# Patient Record
Sex: FEMALE | Race: WHITE | NOT HISPANIC OR LATINO | Employment: FULL TIME | URBAN - METROPOLITAN AREA
[De-identification: names, ages, dates, MRNs, and addresses within clinical notes are randomized per-mention and may not be internally consistent; named-entity substitution may affect disease eponyms.]

---

## 2017-01-26 RX ORDER — FAMCICLOVIR 500 MG/1
500 TABLET ORAL 2 TIMES DAILY
Qty: 60 TAB | Refills: 1 | Status: SHIPPED | OUTPATIENT
Start: 2017-01-26 | End: 2018-06-15 | Stop reason: SDUPTHER

## 2017-01-26 NOTE — TELEPHONE ENCOUNTER
Please notify patient she is due for her follow-up annual exam, we have not seen her since October 2015

## 2017-02-13 ENCOUNTER — OFFICE VISIT (OUTPATIENT)
Dept: MEDICAL GROUP | Facility: MEDICAL CENTER | Age: 42
End: 2017-02-13
Payer: COMMERCIAL

## 2017-02-13 VITALS
WEIGHT: 134 LBS | HEART RATE: 59 BPM | BODY MASS INDEX: 21.53 KG/M2 | SYSTOLIC BLOOD PRESSURE: 110 MMHG | HEIGHT: 66 IN | TEMPERATURE: 98.7 F | DIASTOLIC BLOOD PRESSURE: 64 MMHG | OXYGEN SATURATION: 98 %

## 2017-02-13 DIAGNOSIS — C44.91 BASAL CELL CARCINOMA: ICD-10-CM

## 2017-02-13 DIAGNOSIS — Z00.00 PREVENTATIVE HEALTH CARE: Chronic | ICD-10-CM

## 2017-02-13 DIAGNOSIS — Z86.59 HISTORY OF DEPRESSION: ICD-10-CM

## 2017-02-13 PROCEDURE — 99396 PREV VISIT EST AGE 40-64: CPT | Performed by: INTERNAL MEDICINE

## 2017-02-13 ASSESSMENT — ENCOUNTER SYMPTOMS
PALPITATIONS: 0
BLURRED VISION: 0
INSOMNIA: 0
HEARTBURN: 0
HEADACHES: 0
ABDOMINAL PAIN: 0
WEIGHT LOSS: 0
DEPRESSION: 0
DOUBLE VISION: 0
COUGH: 0
SHORTNESS OF BREATH: 0
CONSTIPATION: 0
DIARRHEA: 0

## 2017-02-13 ASSESSMENT — PATIENT HEALTH QUESTIONNAIRE - PHQ9: CLINICAL INTERPRETATION OF PHQ2 SCORE: 0

## 2017-02-13 NOTE — MR AVS SNAPSHOT
"        Jing Rojas   2017 4:40 PM   Office Visit   MRN: [id number]    Department:  [facility]   Dept Phone:  [phone]    Description:  Female : 1975   Provider:  Azael Nunes M.D.           Allergies as of 2017     No Known Allergies      You were diagnosed with     Basal cell carcinoma   [[id number]]       Preventative health care   [[id number]]         Vital Signs     Blood Pressure Pulse Temperature Height Weight Body Mass Index    110/64 mmHg 59 37.1 °C (98.7 °F) 1.676 m (5' 6\") 60.782 kg (134 lb) 21.64 kg/m2    Oxygen Saturation Smoking Status                98% Never Smoker           Basic Information     Date Of Birth Sex Race Ethnicity Preferred Language    1975 Female White Non- English      Problem List              ICD-10-CM Priority Class Noted - Resolved    History of depression Z86.59   2009 - Present    Preventative health care (Chronic) Z00.00   2009 - Present    Insomnia (Chronic) G47.00   2009 - Present    Dyslipidemia (Chronic) E78.5   2011 - Present    Basal cell carcinoma C44.91   2017 - Present      Health Maintenance        Date Due Completion Dates    PAP SMEAR 1996 ---    MAMMOGRAM 2015 ---    IMM DTaP/Tdap/Td Vaccine (2 - Td) 2023            Current Immunizations     Influenza Vaccine Quad Inj (Pf) 2016 10:41 AM, 10/27/2015  9:50 AM    Tdap Vaccine 2013      Below and/or attached are the medications your provider expects you to take. Review all of your home medications and newly ordered medications with your provider and/or pharmacist. Follow medication instructions as directed by your provider and/or pharmacist. Please keep your medication list with you and share with your provider. Update the information when medications are discontinued, doses are changed, or new medications (including over-the-counter products) are added; and carry medication information at all times in the event of " emergency situations     Allergies:  No Known Allergies          Medications  Valid as of: February 13, 2017 -  5:26 PM    Generic Name Brand Name Tablet Size Instructions for use    Drospirenone-Ethinyl Estradiol (Tab) MARION 3-0.02 MG Take 1 Tab by mouth every day.        Famciclovir (Tab) FAMVIR 500 MG Take 1 Tab by mouth 2 Times a Day.        Sertraline HCl (Tab) ZOLOFT 50 MG Take 1 Tab by mouth every day.        Temazepam (Cap) RESTORIL 15 MG Take 1 Cap by mouth at bedtime as needed for Sleep.        .                 Medicines prescribed today were sent to:     [facility] #[id number] - [place], [place] - [address]    [address] [place] [address]    Phone: [phone] Fax: [phone]    Open 24 Hours?: No      Medication refill instructions:       If your prescription bottle indicates you have medication refills left, it is not necessary to call your provider’s office. Please contact your pharmacy and they will refill your medication.    If your prescription bottle indicates you do not have any refills left, you may request refills at any time through one of the following ways: The online [employer] system (except Urgent Care), by calling your provider’s office, or by asking your pharmacy to contact your provider’s office with a refill request. Medication refills are processed only during regular business hours and may not be available until the next business day. Your provider may request additional information or to have a follow-up visit with you prior to refilling your medication.   *Please Note: Medication refills are assigned a new Rx number when refilled electronically. Your pharmacy may indicate that no refills were authorized even though a new prescription for the same medication is available at the pharmacy. Please request the medicine by name with the pharmacy before contacting your provider for a refill.        Your To Do List     Future Labs/Procedures Complete By Expires    CBC  WITH DIFFERENTIAL  As directed 2/14/2018    COMP METABOLIC PANEL  As directed 2/14/2018    LIPID PROFILE  As directed 2/14/2018    TSH  As directed 2/14/2018    VITAMIN D,25 HYDROXY  As directed 2/14/2018      Other Notes About Your Plan                      Bitstrips Access Code: [email or web address]  Expires: 3/15/2017  5:26 PM    Bitstrips  A secure, online tool to manage your health information     [employer]’s Bitstrips® is a secure, online tool that connects you to your personalized health information from the privacy of your home -- day or night - making it very easy for you to manage your healthcare. Once the activation process is completed, you can even access your medical information using the Bitstrips katiana, which is available for free in the [employer] Katiana store or Google Play store.     Bitstrips provides the following levels of access (as shown below):   My Chart Features   [facility]  Specialists [facility]  Urgent  Care Non-[facility]  Primary Care  Doctor   Email your healthcare team securely and privately 24/7 X X X    Manage appointments: schedule your next appointment; view details of past/upcoming appointments X      Request prescription refills. X      View recent personal medical records, including lab and immunizations X X X X   View health record, including health history, allergies, medications X X X X   Read reports about your outpatient visits, procedures, consult and ER notes X X X X   See your discharge summary, which is a recap of your hospital and/or ER visit that includes your diagnosis, lab results, and care plan. X X       How to register for Bitstrips:  1. Go to  [email or web address].  2. Click on the Sign Up Now box, which takes you to the New Member Sign Up page. You will need to provide the following information:  a. Enter your Bitstrips Access Code exactly as it appears at the top of this page. (You will not need to use this code after you’ve completed the sign-up process. If  you do not sign up before the expiration date, you must request a new code.)   b. Enter your date of birth.   c. Enter your home email address.   d. Click Submit, and follow the next screen’s instructions.  3. Create a [employer] ID. This will be your [employer] login ID and cannot be changed, so think of one that is secure and easy to remember.  4. Create a [employer] password. You can change your password at any time.  5. Enter your Password Reset Question and Answer. This can be used at a later time if you forget your password.   6. Enter your e-mail address. This allows you to receive e-mail notifications when new information is available in [employer].  7. Click Sign Up. You can now view your health information.    For assistance activating your [employer] account, call [phone]

## 2017-02-14 NOTE — PROGRESS NOTES
Subjective:      Jing Rojas is a 41 y.o. female who presents with annual exam       HPI     Here for annual exam  Sees gyn   Work out 4-5 days per week and does cardio and weights daily     SH no tobacco, etoh occasionally,diet gluten free, works as teacher   FH father heart disease  On zoloft 100 mg daily per gynecology for depression    Eye exam none recently   Teeth cleaning regularly  No regular nsaids    Skin lesion removed  basal cell forehead  Medications, allergies, medical history, surgical history, social history, family history reviewed and updated        Current Outpatient Prescriptions   Medication Sig Dispense Refill   • famciclovir (FAMVIR) 500 MG Tab Take 1 Tab by mouth 2 Times a Day. 60 Tab 1   • temazepam (RESTORIL) 15 MG Cap Take 1 Cap by mouth at bedtime as needed for Sleep. 30 Cap 3   • drospirenone-ethinyl estradiol (JOSE) 3-0.02 MG per tablet Take 1 Tab by mouth every day. 28 Tab 3   • sertraline (ZOLOFT) 50 MG TABS Take 1 Tab by mouth every day. 30 Tab 11     No current facility-administered medications for this visit.     Patient Active Problem List    Diagnosis Date Noted   • Dyslipidemia 08/17/2011   • Insomnia 11/25/2009   • History of depression 06/28/2009   • Preventative health care 06/28/2009       Preventative  2/14/13 tdap  2/14/13 vit d 35  2014 gyn   10/5/15 on jose per gyn     Insomnia  1/25/13 on ambien, change to restoril 30 mg  2/14/13 taper off restoril since trying to become pregnant  2/19/14 ambien per  gyn  8/1/14 on restoril 15 mg    History depression  11/08 zoloft since    6/11 on zoloft 100 mg   2/14/13 tapering off zoloft down to 50 mg; then off if possible since trying to become pregnant  8/1/14 on zoloft 50 mg  10/5/15 on zoloft 100 mg per gynecology, tried to taper off this summer but not successful    Dyslipidemia  8/11 chol 199,trig 162,hdl 60,ldl 107      Depression Screening    Little interest or pleasure in doing things?   "0 - not at all  Feeling down, depressed , or hopeless? 0 - not at all  Patient Health Questionnaire Score: 0     If depressive symptoms identified deferred to follow up visit unless specifically addressed in assesment and plan.        Review of Systems   Constitutional: Negative for weight loss and malaise/fatigue.   Eyes: Negative for blurred vision and double vision.   Respiratory: Negative for cough and shortness of breath.    Cardiovascular: Negative for chest pain and palpitations.   Gastrointestinal: Negative for heartburn, abdominal pain, diarrhea and constipation.   Genitourinary: Negative for frequency.   Musculoskeletal: Negative for joint pain.   Neurological: Negative for headaches.   Endo/Heme/Allergies: Negative for environmental allergies.   Psychiatric/Behavioral: Negative for depression. The patient does not have insomnia.           Objective:     /64 mmHg  Pulse 59  Temp(Src) 37.1 °C (98.7 °F)  Ht 1.676 m (5' 6\")  Wt 60.782 kg (134 lb)  BMI 21.64 kg/m2  SpO2 98%     Physical Exam   Constitutional: She appears well-developed and well-nourished. No distress.   HENT:   Head: Normocephalic and atraumatic.   Right Ear: External ear normal.   Left Ear: External ear normal.   Mouth/Throat: Oropharynx is clear and moist.   Eyes: Conjunctivae are normal. Right eye exhibits no discharge. Left eye exhibits no discharge. No scleral icterus.   Neck: Normal range of motion. Neck supple. No JVD present. No thyromegaly present.   Cardiovascular: Normal rate and regular rhythm.    No murmur heard.  Pulmonary/Chest: Effort normal and breath sounds normal. No respiratory distress. She has no wheezes.   Abdominal: She exhibits no distension.   Musculoskeletal: She exhibits no edema.   Neurological: She is alert.   Skin: Skin is warm. She is not diaphoretic.   Psychiatric: She has a normal mood and affect. Her behavior is normal.   Nursing note and vitals reviewed.              Assessment/Plan: "     Assessment  #1 annual exam    #2 depression stable on Zoloft 100 mg per gynecology, has seen therapists previously, no worsening mood changes, good social support with family, , stress at work is stable    #3 basal cell skin cancer followed by dermatology, uses sunscreen    #4 insomnia stable on Restoril intermittently, does not use nightly    #5 HSV on Famvir suppressive therapy    #6 birth control on jose per gynecology      Plan  #!  Labs    #2 mammogram per gynecology    #3 follow-up dermatology, use sunscreen    #4 sleep hygiene discussed    #5 lifestyle modification, diet, exercise discussed    #6 okay to taper Zoloft from my perspective, if she does initiate taper Zoloft 100 mg alternating with 50 mg for at least 2 weeks and monitor mood, if stable can decrease by 50 mg every 2-3 weeks    #7 declined psychotherapy follow-up at this time good social support, exercising, keep alcohol to minimum, continue no caffeine, good sleep hygiene discussed    #8 follow-up one year

## 2017-03-20 PROBLEM — L57.9 SKIN CHANGES DUE TO CHRONIC EXPOSURE TO NONIONIZING RADIATION, UNSPECIFIED: Status: ACTIVE | Noted: 2017-03-20

## 2017-09-13 ENCOUNTER — HOSPITAL ENCOUNTER (OUTPATIENT)
Dept: RADIOLOGY | Facility: MEDICAL CENTER | Age: 42
End: 2017-09-13
Attending: OBSTETRICS & GYNECOLOGY
Payer: COMMERCIAL

## 2017-09-13 DIAGNOSIS — Z12.31 VISIT FOR SCREENING MAMMOGRAM: ICD-10-CM

## 2017-09-13 PROCEDURE — G0202 SCR MAMMO BI INCL CAD: HCPCS

## 2017-09-25 ENCOUNTER — HOSPITAL ENCOUNTER (OUTPATIENT)
Dept: LAB | Facility: MEDICAL CENTER | Age: 42
End: 2017-09-25
Attending: INTERNAL MEDICINE
Payer: COMMERCIAL

## 2017-09-25 DIAGNOSIS — Z00.00 PREVENTATIVE HEALTH CARE: Chronic | ICD-10-CM

## 2017-09-25 PROBLEM — R94.4 DECREASED GFR: Status: ACTIVE | Noted: 2017-09-25

## 2017-09-25 LAB
25(OH)D3 SERPL-MCNC: 36 NG/ML (ref 30–100)
ALBUMIN SERPL BCP-MCNC: 3.9 G/DL (ref 3.2–4.9)
ALBUMIN/GLOB SERPL: 1.3 G/DL
ALP SERPL-CCNC: 27 U/L (ref 30–99)
ALT SERPL-CCNC: 13 U/L (ref 2–50)
ANION GAP SERPL CALC-SCNC: 8 MMOL/L (ref 0–11.9)
AST SERPL-CCNC: 22 U/L (ref 12–45)
BASOPHILS # BLD AUTO: 0.9 % (ref 0–1.8)
BASOPHILS # BLD: 0.06 K/UL (ref 0–0.12)
BILIRUB SERPL-MCNC: 0.5 MG/DL (ref 0.1–1.5)
BUN SERPL-MCNC: 17 MG/DL (ref 8–22)
CALCIUM SERPL-MCNC: 9.4 MG/DL (ref 8.5–10.5)
CHLORIDE SERPL-SCNC: 103 MMOL/L (ref 96–112)
CHOLEST SERPL-MCNC: 197 MG/DL (ref 100–199)
CO2 SERPL-SCNC: 25 MMOL/L (ref 20–33)
CREAT SERPL-MCNC: 1.1 MG/DL (ref 0.5–1.4)
EOSINOPHIL # BLD AUTO: 0.43 K/UL (ref 0–0.51)
EOSINOPHIL NFR BLD: 6.6 % (ref 0–6.9)
ERYTHROCYTE [DISTWIDTH] IN BLOOD BY AUTOMATED COUNT: 39.9 FL (ref 35.9–50)
GFR SERPL CREATININE-BSD FRML MDRD: 55 ML/MIN/1.73 M 2
GLOBULIN SER CALC-MCNC: 3.1 G/DL (ref 1.9–3.5)
GLUCOSE SERPL-MCNC: 77 MG/DL (ref 65–99)
HCT VFR BLD AUTO: 40.3 % (ref 37–47)
HDLC SERPL-MCNC: 91 MG/DL
HGB BLD-MCNC: 13.2 G/DL (ref 12–16)
IMM GRANULOCYTES # BLD AUTO: 0.01 K/UL (ref 0–0.11)
IMM GRANULOCYTES NFR BLD AUTO: 0.2 % (ref 0–0.9)
LDLC SERPL CALC-MCNC: 79 MG/DL
LYMPHOCYTES # BLD AUTO: 2.58 K/UL (ref 1–4.8)
LYMPHOCYTES NFR BLD: 39.7 % (ref 22–41)
MCH RBC QN AUTO: 30.3 PG (ref 27–33)
MCHC RBC AUTO-ENTMCNC: 32.8 G/DL (ref 33.6–35)
MCV RBC AUTO: 92.6 FL (ref 81.4–97.8)
MONOCYTES # BLD AUTO: 0.47 K/UL (ref 0–0.85)
MONOCYTES NFR BLD AUTO: 7.2 % (ref 0–13.4)
NEUTROPHILS # BLD AUTO: 2.95 K/UL (ref 2–7.15)
NEUTROPHILS NFR BLD: 45.4 % (ref 44–72)
NRBC # BLD AUTO: 0 K/UL
NRBC BLD AUTO-RTO: 0 /100 WBC
PLATELET # BLD AUTO: 230 K/UL (ref 164–446)
PMV BLD AUTO: 10.8 FL (ref 9–12.9)
POTASSIUM SERPL-SCNC: 4.4 MMOL/L (ref 3.6–5.5)
PROT SERPL-MCNC: 7 G/DL (ref 6–8.2)
RBC # BLD AUTO: 4.35 M/UL (ref 4.2–5.4)
SODIUM SERPL-SCNC: 136 MMOL/L (ref 135–145)
TRIGL SERPL-MCNC: 135 MG/DL (ref 0–149)
TSH SERPL DL<=0.005 MIU/L-ACNC: 2.96 UIU/ML (ref 0.3–3.7)
WBC # BLD AUTO: 6.5 K/UL (ref 4.8–10.8)

## 2017-09-25 PROCEDURE — 84443 ASSAY THYROID STIM HORMONE: CPT

## 2017-09-25 PROCEDURE — 85025 COMPLETE CBC W/AUTO DIFF WBC: CPT

## 2017-09-25 PROCEDURE — 82306 VITAMIN D 25 HYDROXY: CPT

## 2017-09-25 PROCEDURE — 80053 COMPREHEN METABOLIC PANEL: CPT

## 2017-09-25 PROCEDURE — 36415 COLL VENOUS BLD VENIPUNCTURE: CPT

## 2017-09-25 PROCEDURE — 80061 LIPID PANEL: CPT

## 2017-09-29 ENCOUNTER — OFFICE VISIT (OUTPATIENT)
Dept: MEDICAL GROUP | Facility: MEDICAL CENTER | Age: 42
End: 2017-09-29
Payer: COMMERCIAL

## 2017-09-29 VITALS
HEIGHT: 66 IN | BODY MASS INDEX: 22.02 KG/M2 | TEMPERATURE: 99.7 F | WEIGHT: 137 LBS | SYSTOLIC BLOOD PRESSURE: 114 MMHG | OXYGEN SATURATION: 99 % | HEART RATE: 59 BPM | DIASTOLIC BLOOD PRESSURE: 62 MMHG

## 2017-09-29 DIAGNOSIS — Z86.59 HISTORY OF DEPRESSION: ICD-10-CM

## 2017-09-29 DIAGNOSIS — J02.9 SORE THROAT: ICD-10-CM

## 2017-09-29 LAB
INT CON NEG: NEGATIVE
INT CON POS: POSITIVE
S PYO AG THROAT QL: NORMAL

## 2017-09-29 PROCEDURE — 99213 OFFICE O/P EST LOW 20 MIN: CPT | Performed by: INTERNAL MEDICINE

## 2017-09-29 PROCEDURE — 87880 STREP A ASSAY W/OPTIC: CPT | Performed by: INTERNAL MEDICINE

## 2017-09-29 RX ORDER — TEMAZEPAM 15 MG/1
15 CAPSULE ORAL NIGHTLY PRN
Qty: 30 CAP | Refills: 5 | Status: SHIPPED
Start: 2017-09-29 | End: 2018-04-11 | Stop reason: SDUPTHER

## 2017-09-29 RX ORDER — DROSPIRENONE AND ETHINYL ESTRADIOL 0.02-3(28)
1 KIT ORAL DAILY
Qty: 28 TAB | Refills: 0
Start: 2017-09-29 | End: 2018-10-16

## 2017-09-29 RX ORDER — ALPRAZOLAM 0.25 MG/1
0.25 TABLET ORAL
Qty: 15 TAB | Refills: 0 | Status: SHIPPED | OUTPATIENT
Start: 2017-09-29 | End: 2018-06-15 | Stop reason: SDUPTHER

## 2017-09-29 NOTE — PROGRESS NOTES
Subjective:      Jing Rojas is a 41 y.o. female who presents with sore throat Follow-Up            HPI  New complaint  Sore throat since monday, tired and fatigued, body aches started sunday improving took motrin 200 mg 3 once per day, no nausea or emesis, joint aches as well, headaches frontal, no vision changes, no neck stiffness, no sinus pressure, no post nasal drip, no cough, no sob.  Symptoms are improving  No recent travel, no recent antibiotics    Has been more irritable recently for 4-5 months, has been exercising regularly, sleep is reasonable, on Zoloft 100 mg, was on 50 mg and increased the dose last week.  Denies any increased stress at home or work.  No depression.  Good social support with family.  Followed by gynecologist    Tries to manage stress by exercising regular, does massage therapy, tries to do meditation  Does have follow-up appointment with therapist in 2 weeks  Minimal caffeine and alcohol  No recent travel or antibiotics   No recent travel   Sees gyn  saw recently  Menses regular taking marion           Current Outpatient Prescriptions   Medication Sig Dispense Refill   • famciclovir (FAMVIR) 500 MG Tab Take 1 Tab by mouth 2 Times a Day. 60 Tab 1   • temazepam (RESTORIL) 15 MG Cap Take 1 Cap by mouth at bedtime as needed for Sleep. 30 Cap 3   • drospirenone-ethinyl estradiol (MARION) 3-0.02 MG per tablet Take 1 Tab by mouth every day. 28 Tab 3   • sertraline (ZOLOFT) 50 MG TABS Take 1 Tab by mouth every day. 30 Tab 11     No current facility-administered medications for this visit.      Patient Active Problem List    Diagnosis Date Noted   • Decreased GFR 09/25/2017   • Basal cell carcinoma 02/13/2017   • Dyslipidemia 08/17/2011   • Insomnia 11/25/2009   • History of depression 06/28/2009   • Preventative health care 06/28/2009       Basal cell carcinoma  2/13/17 basal cell forehead    Decreased GFR  8/17/11 bun 15,creat 0.5,GFR 59  10/27/15 bun 15,creat 1.1,GFR>60  11/12/15 bun  17,creat 0.9,GFR 54  11/5/16 bun 19,creat 1.0,GFR>60  9/25/17 bun 17,creat 1.1,GFR 55    Dyslipidemia  8/11 chol 199,trig 162,hdl 60,ldl 107  9/25/17 chol 197,trig 135,hdl 91,ldl 79     History depression  11/08 zoloft since    6/11 on zoloft 100 mg   2/14/13 tapering off zoloft down to 50 mg; then off if possible since trying to become pregnant  8/1/14 on zoloft 50 mg  10/5/15 on zoloft 100 mg per gynecology, tried to taper off this summer but not successful  2/13/17 on zoloft 100 mg per gynecology, will try to taper    Insomnia  1/25/13 on ambien, change to restoril 30 mg  2/14/13 taper off restoril since trying to become pregnant  2/19/14 ambien per  gyn  8/1/14 on restoril 15 mg      Preventative  2/14/13 tdap  2014 gyn   10/5/15 on jose per gyn   9/25/17 vit d 36       ROS       Objective:          Physical Exam   Constitutional: She appears well-developed and well-nourished. No distress.   HENT:   Head: Normocephalic and atraumatic.   Right Ear: External ear normal.   Left Ear: External ear normal.   Nose: Nose normal.   Mouth/Throat: Oropharynx is clear and moist.   Eyes: Conjunctivae are normal. Right eye exhibits no discharge. Left eye exhibits no discharge. No scleral icterus.   Neck: Neck supple. No JVD present. No thyromegaly present.   Cardiovascular: Normal rate, regular rhythm and normal heart sounds.    Pulmonary/Chest: Effort normal and breath sounds normal. No respiratory distress. She has no wheezes.   Abdominal: She exhibits no distension.   Musculoskeletal: She exhibits no edema.   Neurological: She has normal reflexes.   Skin: Skin is warm. No rash noted. She is not diaphoretic. No erythema.   Psychiatric: She has a normal mood and affect. Her behavior is normal.   Nursing note and vitals reviewed.         Normal affect, insight, judgment     Assessment/Plan:        Assessment  #! Pharyngitis.  No adenopathy, symptoms improving, rapid strep test negative likely  resolving viral infection    #2 irritability on Zoloft 100 mg having recently increased dose from 50 mg 1-1/2 weeks ago, no depression    #3 breath control pill jose per gynecology      Plan  #!  Rapid strep Strep Negative, likely resolving viral illness, recommended rest, hydration    #2 flu vaccine at pharmacy in one week    #3 since she just increased zoloft to 100 mg 1-1/2 weeks ago continue on the current dosing regimen, may take 2 more weeks for benefit,, monitor for persistent irritability, continue exercise, diet, meditation, yoga, massage therapy     #4 Notify us in one and half weeks whether or not experiencing improvement with Zoloft if not consider increasing dose to 150 or trying different medication    #5 continue psychotherapy    #6 notify us for worsening symptoms    #7 follow up 3 months

## 2017-09-30 NOTE — TELEPHONE ENCOUNTER
Restoril      Was the patient seen in the last year in this department? Yes Last 8/29/17    Does patient have an active prescription for medications requested? Yes     Received Request Via: Pharmacy

## 2018-04-11 DIAGNOSIS — G47.00 INSOMNIA, UNSPECIFIED TYPE: Chronic | ICD-10-CM

## 2018-04-11 RX ORDER — TEMAZEPAM 15 MG/1
15 CAPSULE ORAL NIGHTLY PRN
Qty: 30 CAP | Refills: 3 | Status: SHIPPED
Start: 2018-04-11 | End: 2018-05-11

## 2018-08-30 DIAGNOSIS — G47.00 INSOMNIA, UNSPECIFIED TYPE: ICD-10-CM

## 2018-08-30 RX ORDER — TEMAZEPAM 15 MG/1
15 CAPSULE ORAL NIGHTLY PRN
Qty: 30 CAP | Refills: 2 | Status: SHIPPED
Start: 2018-08-30 | End: 2018-09-29

## 2018-10-11 ENCOUNTER — OFFICE VISIT (OUTPATIENT)
Dept: URGENT CARE | Facility: CLINIC | Age: 43
End: 2018-10-11
Payer: COMMERCIAL

## 2018-10-11 VITALS
SYSTOLIC BLOOD PRESSURE: 92 MMHG | DIASTOLIC BLOOD PRESSURE: 60 MMHG | OXYGEN SATURATION: 97 % | HEIGHT: 65 IN | RESPIRATION RATE: 16 BRPM | HEART RATE: 66 BPM | WEIGHT: 166 LBS | TEMPERATURE: 99.7 F | BODY MASS INDEX: 27.66 KG/M2

## 2018-10-11 DIAGNOSIS — R53.83 FATIGUE, UNSPECIFIED TYPE: ICD-10-CM

## 2018-10-11 DIAGNOSIS — M79.10 MYALGIA: ICD-10-CM

## 2018-10-11 DIAGNOSIS — H10.403 CHRONIC CONJUNCTIVITIS OF BOTH EYES, UNSPECIFIED CHRONIC CONJUNCTIVITIS TYPE: ICD-10-CM

## 2018-10-11 PROCEDURE — 99204 OFFICE O/P NEW MOD 45 MIN: CPT | Performed by: EMERGENCY MEDICINE

## 2018-10-11 RX ORDER — ALPRAZOLAM 0.5 MG/1
0.5 TABLET ORAL NIGHTLY PRN
COMMUNITY
End: 2018-10-16 | Stop reason: SDUPTHER

## 2018-10-11 ASSESSMENT — ENCOUNTER SYMPTOMS
EYE REDNESS: 1
COUGH: 1
CHANGE IN BOWEL HABIT: 0
NAUSEA: 0
FATIGUE: 1
ABDOMINAL PAIN: 0
BRUISES/BLEEDS EASILY: 0
FEVER: 0
MYALGIAS: 1
ANOREXIA: 0
VOMITING: 0
DEPRESSION: 0
SORE THROAT: 1
SHORTNESS OF BREATH: 0
INSOMNIA: 1
BLOOD IN STOOL: 0
HEADACHES: 0
SWOLLEN GLANDS: 0
NUMBNESS: 0
ARTHRALGIAS: 1
JOINT SWELLING: 0
EYE DISCHARGE: 0

## 2018-10-11 ASSESSMENT — PATIENT HEALTH QUESTIONNAIRE - PHQ9: CLINICAL INTERPRETATION OF PHQ2 SCORE: 0

## 2018-10-11 NOTE — PATIENT INSTRUCTIONS
Ketotifen eye solution  What is this medicine?  KETOTIFEN (son toe NORMA fen) eye drops are used in the eye to prevent itching that is caused by allergies.  This medicine may be used for other purposes; ask your health care provider or pharmacist if you have questions.  COMMON BRAND NAME(S): Alaway, Children's Alaway, Claritin Eye, Eye Itch Relief, Itchy Eye, Zaditor, Zyrtec Itchy Eye  What should I tell my health care provider before I take this medicine?  They need to know if you have any of these conditions:  -wear contact lenses  -an unusual or allergic reaction to ketotifen, other medicines, foods, dyes, or preservatives  -pregnant or trying to get pregnant  -breast-feeding  How should I use this medicine?  This medicine is only for use in the eye. Do not take by mouth. Follow the directions on the prescription label. Wash hands before and after use. Tilt the head back slightly and pull down the lower eyelid with the index finger to form a pouch. Try not to touch the tip of the dropper to your eye, fingertips, or any other surface. Squeeze the prescribed number of drops into the pouch. Close the eye gently to spread the drops. Your vision may blur for a few minutes. Use your doses at regular intervals. Do not use your medicine more often than directed. If you use other eye medicines, they should be used at least 10 minutes before or after this medicine. Eye ointments should be applied last.  Talk to your pediatrician regarding the use of this medicine in children. Special care may be needed. While this drug may be prescribed for children as young as 3 years of age for selected conditions, precautions do apply.  Overdosage: If you think you have taken too much of this medicine contact a poison control center or emergency room at once.  NOTE: This medicine is only for you. Do not share this medicine with others.  What if I miss a dose?  If you miss a dose, use it as soon as you can. If it is almost time for your  next dose, use only that dose. Do not use double or extra doses.  What may interact with this medicine?  Interactions are not expected. Do not use any other eye products without telling your doctor or health care professional.  This list may not describe all possible interactions. Give your health care provider a list of all the medicines, herbs, non-prescription drugs, or dietary supplements you use. Also tell them if you smoke, drink alcohol, or use illegal drugs. Some items may interact with your medicine.  What should I watch for while using this medicine?  Tell your doctor or health care professional if your symptoms do not start to improve in 2 or 3 days.  Report any serious side effects promptly. Stop using this medicine if your eyes get swollen, painful, or have a discharge, and see your doctor or health care professional as soon as you can.  Contact lenses may be inserted 10 minutes after putting the medicine in the eye. Do not wear contact lenses if your eyes are red. You should not use this medicine to treat irritation that is caused by contact lenses.  What side effects may I notice from receiving this medicine?  Side effects that you should report to your doctor or health care professional as soon as possible:  -skin rash, hives, or itching  -swelling of the lips, tongue or face  Side effects that usually do not require medical attention (report to your doctor or health care professional if they continue or are bothersome):  -burning, discomfort, stinging, or tearing immediately after use  -eyelid swelling or eye dryness  -headache  -sensitivity of the eyes to sunlight  This list may not describe all possible side effects. Call your doctor for medical advice about side effects. You may report side effects to FDA at 1-067-FDA-9759.  Where should I keep my medicine?  Keep out of the reach of children.  Store between 4 and 25 degrees C (39 and 77 degrees F). Do not freeze. Protect from light. Throw away  any unused medicine after the expiration date.  NOTE: This sheet is a summary. It may not cover all possible information. If you have questions about this medicine, talk to your doctor, pharmacist, or health care provider.  © 2018 [employer]vier/Gold Standard (2009-06-19 14:36:34)  Muscle Pain, Adult  Muscle pain (myalgia) may be mild or severe. In most cases, the pain lasts only a short time and it goes away without treatment. It is normal to feel some muscle pain after starting a workout program. Muscles that have not been used often will be sore at first.  Muscle pain may also be caused by many other things, including:  · Overuse or muscle strain, especially if you are not in shape. This is the most common cause of muscle pain.  · Injury.  · Bruises.  · Viruses, such as the flu.  · Infectious diseases.  · A chronic condition that causes muscle tenderness, fatigue, and headache (fibromyalgia).  · A condition, such as lupus, in which the body’s disease-fighting system attacks other organs in the body (autoimmune or rheumatologic diseases).  · Certain drugs, including ACE inhibitors and statins.  To diagnose the cause of your muscle pain, your health care provider will do a physical exam and ask questions about the pain and when it began. If you have not had muscle pain for very long, your health care provider may want to wait before doing much testing. If your muscle pain has lasted a long time, your health care provider may want to run tests right away. In some cases, this may include tests to rule out certain conditions or illnesses.  Treatment for muscle pain depends on the cause. Home care is often enough to relieve muscle pain. Your health care provider may also prescribe anti-inflammatory medicine.  Follow these instructions at home:  Activity  · If overuse is causing your muscle pain:  ¨ Slow down your activities until the pain goes away.  ¨ Do regular, gentle exercises if you are not usually active.  ¨ Warm up  before exercising. Stretch before and after exercising. This can help lower the risk of muscle pain.  · Do not continue working out if the pain is very bad. Bad pain could mean that you have injured a muscle.  Managing pain and discomfort  · If directed, apply ice to the sore muscle:  ¨ Put ice in a plastic bag.  ¨ Place a towel between your skin and the bag.  ¨ Leave the ice on for 20 minutes, 2-3 times a day.  · You may also alternate between applying ice and applying heat as told by your health care provider. To apply heat, use the heat source that your health care provider recommends, such as a moist heat pack or a heating pad.  ¨ Place a towel between your skin and the heat source.  ¨ Leave the heat on for 20-30 minutes.  ¨ Remove the heat if your skin turns bright red. This is especially important if you are unable to feel pain, heat, or cold. You may have a greater risk of getting burned.  Medicines  · Take over-the-counter and prescription medicines only as told by your health care provider.  · Do not drive or use heavy machinery while taking prescription pain medicine.  Contact a health care provider if:  · Your muscle pain gets worse and medicines do not help.  · You have muscle pain that lasts longer than 3 days.  · You have a rash or fever along with muscle pain.  · You have muscle pain after a tick bite.  · You have muscle pain while working out, even though you are in good physical condition.  · You have redness, soreness, or swelling along with muscle pain.  · You have muscle pain after starting a new medicine or changing the dose of a medicine.  Get help right away if:  · You have trouble breathing.  · You have trouble swallowing.  · You have muscle pain along with a stiff neck, fever, and vomiting.  · You have severe muscle weakness or cannot move part of your body.  This information is not intended to replace advice given to you by your health care provider. Make sure you discuss any questions you  have with your health care provider.  Document Released: 11/09/2007 Document Revised: 07/07/2017 Document Reviewed: 05/09/2017  [employer] Interactive Patient Education © 2017 [employer]  Fatigue  Introduction  Fatigue is feeling tired all of the time, a lack of energy, or a lack of motivation. Occasional or mild fatigue is often a normal response to activity or life in general. However, long-lasting (chronic) or extreme fatigue may indicate an underlying medical condition.  Follow these instructions at home:  Watch your fatigue for any changes. The following actions may help to lessen any discomfort you are feeling:  · Talk to your health care provider about how much sleep you need each night. Try to get the required amount every night.  · Take medicines only as directed by your health care provider.  · Eat a healthy and nutritious diet. Ask your health care provider if you need help changing your diet.  · Drink enough fluid to keep your urine clear or pale yellow.  · Practice ways of relaxing, such as yoga, meditation, massage therapy, or acupuncture.  · Exercise regularly.  · Change situations that cause you stress. Try to keep your work and personal routine reasonable.  · Do not abuse illegal drugs.  · Limit alcohol intake to no more than 1 drink per day for nonpregnant women and 2 drinks per day for men. One drink equals 12 ounces of beer, 5 ounces of wine, or 1½ ounces of hard liquor.  · Take a multivitamin, if directed by your health care provider.  Contact a health care provider if:  · Your fatigue does not get better.  · You have a fever.  · You have unintentional weight loss or gain.  · You have headaches.  · You have difficulty:  ¨ Falling asleep.  ¨ Sleeping throughout the night.  · You feel angry, guilty, anxious, or sad.  · You are unable to have a bowel movement (constipation).  · You skin is dry.  · Your legs or another part of your body is swollen.  Get help right away if:  · You feel  confused.  · Your vision is blurry.  · You feel faint or pass out.  · You have a severe headache.  · You have severe abdominal, pelvic, or back pain.  · You have chest pain, shortness of breath, or an irregular or fast heartbeat.  · You are unable to urinate or you urinate less than normal.  · You develop abnormal bleeding, such as bleeding from the rectum, vagina, nose, lungs, or nipples.  · You vomit blood.  · You have thoughts about harming yourself or committing suicide.  · You are worried that you might harm someone else.  This information is not intended to replace advice given to you by your health care provider. Make sure you discuss any questions you have with your health care provider.  Document Released: 10/14/2008 Document Revised: 05/25/2017 Document Reviewed: 04/21/2015  © 2017 Elsevier

## 2018-10-11 NOTE — PROGRESS NOTES
Subjective:      Jing Rojas is a 42 y.o. female who presents with Cold Symptoms (x9 wks, pt. states she is a  and she feels like her immune system is down, and she knows something is going on. sore throat, body aches, fatigue, lethargic, tremors. Pt. states she feels like she gets rid of it, but the next day is something new. Also unknow bruises on left leg)            Fatigue   This is a new problem. Episode onset: over 2 months. The problem occurs daily. The problem has been waxing and waning. Associated symptoms include arthralgias, coughing, fatigue, myalgias and a sore throat. Pertinent negatives include no abdominal pain, anorexia, change in bowel habit, chest pain, congestion, fever, headaches, joint swelling, nausea, numbness, rash, swollen glands, urinary symptoms or vomiting. Nothing aggravates the symptoms. She has tried rest for the symptoms. The treatment provided mild relief.   No trauma; noted bruises on legs yesterday.    Review of Systems   Constitutional: Positive for fatigue and malaise/fatigue. Negative for fever.   HENT: Positive for sore throat. Negative for congestion, ear pain, hearing loss and nosebleeds.    Eyes: Positive for redness. Negative for discharge.   Respiratory: Positive for cough. Negative for shortness of breath.         Occasional   Cardiovascular: Negative for chest pain.   Gastrointestinal: Negative for abdominal pain, anorexia, blood in stool, change in bowel habit, nausea and vomiting.   Genitourinary: Negative for dysuria, frequency, hematuria and urgency.   Musculoskeletal: Positive for arthralgias, joint pain and myalgias. Negative for joint swelling.   Skin: Negative for rash.   Neurological: Negative for numbness and headaches.   Endo/Heme/Allergies: Negative for environmental allergies. Does not bruise/bleed easily.   Psychiatric/Behavioral: Negative for depression. The patient has insomnia.      PMH:  has a past medical history of Acne (6/28/2009);  "Contraception (6/28/2009); Depression (6/28/2009); HSV-1 (herpes simplex virus 1) infection (6/28/2009); and Preventative health care (6/28/2009).  MEDS:   Current Outpatient Prescriptions:   •  ALPRAZolam (XANAX) 0.5 MG Tab, Take 0.5 mg by mouth at bedtime as needed for Sleep., Disp: , Rfl:   •  drospirenone-ethinyl estradiol (MARION) 3-0.02 MG per tablet, Take 1 Tab by mouth every day., Disp: 28 Tab, Rfl: 0  •  drospirenone-ethinyl estradiol (MARION) 3-0.02 MG per tablet, Take 1 Tab by mouth every day., Disp: 28 Tab, Rfl: 3  •  sertraline (ZOLOFT) 50 MG TABS, Take 1 Tab by mouth every day., Disp: 30 Tab, Rfl: 11  •  famciclovir (FAMVIR) 500 MG Tab, Take 1 Tab by mouth 2 Times a Day., Disp: 8 Tab, Rfl: 6  ALLERGIES: No Known Allergies  SURGHX: History reviewed. No pertinent surgical history.  SOCHX:  reports that she has never smoked. She has never used smokeless tobacco. She reports that she drinks alcohol.  FH: family history is not on file.       Objective:     BP (!) 92/60 (BP Location: Left arm, Patient Position: Sitting, BP Cuff Size: Adult)   Pulse 66   Temp 37.6 °C (99.7 °F) (Temporal)   Resp 16   Ht 1.651 m (5' 5\")   Wt 75.3 kg (166 lb)   LMP 09/10/2018 (Within Days)   SpO2 97%   Breastfeeding? No   BMI 27.62 kg/m²      Physical Exam   Constitutional: She is oriented to person, place, and time. She appears well-developed and well-nourished. She is cooperative. She does not have a sickly appearance. She does not appear ill. No distress.   HENT:   Head: Normocephalic.   Right Ear: Tympanic membrane and ear canal normal.   Left Ear: Tympanic membrane and ear canal normal.   Nose: No mucosal edema or rhinorrhea.   Mouth/Throat: Mucous membranes are normal. No trismus in the jaw. No uvula swelling. No oropharyngeal exudate, posterior oropharyngeal edema or posterior oropharyngeal erythema.   Eyes: Lids are normal. Right eye exhibits no hordeolum. Left eye exhibits no hordeolum. Right conjunctiva is injected. " Right conjunctiva has no hemorrhage. Left conjunctiva is injected. Left conjunctiva has no hemorrhage.   Neck: Phonation normal. Neck supple. No JVD present. Tracheal tenderness present. No thyroid mass and no thyromegaly present.   Cardiovascular: Normal rate, regular rhythm and normal heart sounds.    No murmur heard.  Pulmonary/Chest: Effort normal and breath sounds normal.   Abdominal: Soft. Bowel sounds are normal. She exhibits no mass. There is no hepatosplenomegaly. There is no tenderness.   Lymphadenopathy:     She has no cervical adenopathy.        Right: No supraclavicular adenopathy present.        Left: No supraclavicular adenopathy present.   Neurological: She is alert and oriented to person, place, and time. She displays no tremor. She exhibits normal muscle tone. Coordination normal.   Skin: Skin is warm and dry.   Psychiatric: She has a normal mood and affect. Her speech is normal and behavior is normal. Judgment and thought content normal.               Assessment/Plan:     1. Fatigue, unspecified type  Stop Restoril  - CBC WITH DIFFERENTIAL; Future  - COMP METABOLIC PANEL; Future  - URINALYSIS,CULTURE IF INDICATED; Future  - TSH WITH REFLEX TO FT4; Future  - REFERRAL TO FOLLOW-UP WITH PRIMARY CARE    2. Myalgia  Tylenol prn  - WESTERGREN SED RATE; Future  - REFERRAL TO FOLLOW-UP WITH PRIMARY CARE    3. Chronic conjunctivitis of both eyes, unspecified chronic conjunctivitis type  OTC ketotifen

## 2018-10-15 ENCOUNTER — HOSPITAL ENCOUNTER (OUTPATIENT)
Dept: LAB | Facility: MEDICAL CENTER | Age: 43
End: 2018-10-15
Attending: EMERGENCY MEDICINE
Payer: COMMERCIAL

## 2018-10-15 DIAGNOSIS — R53.83 FATIGUE, UNSPECIFIED TYPE: ICD-10-CM

## 2018-10-15 DIAGNOSIS — M79.10 MYALGIA: ICD-10-CM

## 2018-10-15 LAB
ALBUMIN SERPL BCP-MCNC: 3.9 G/DL (ref 3.2–4.9)
ALBUMIN/GLOB SERPL: 1.3 G/DL
ALP SERPL-CCNC: 31 U/L (ref 30–99)
ALT SERPL-CCNC: 13 U/L (ref 2–50)
ANION GAP SERPL CALC-SCNC: 4 MMOL/L (ref 0–11.9)
APPEARANCE UR: CLEAR
AST SERPL-CCNC: 18 U/L (ref 12–45)
BACTERIA #/AREA URNS HPF: NEGATIVE /HPF
BASOPHILS # BLD AUTO: 0.7 % (ref 0–1.8)
BASOPHILS # BLD: 0.04 K/UL (ref 0–0.12)
BILIRUB SERPL-MCNC: 0.4 MG/DL (ref 0.1–1.5)
BILIRUB UR QL STRIP.AUTO: NEGATIVE
BUN SERPL-MCNC: 16 MG/DL (ref 8–22)
CALCIUM SERPL-MCNC: 8.9 MG/DL (ref 8.4–10.2)
CHLORIDE SERPL-SCNC: 106 MMOL/L (ref 96–112)
CO2 SERPL-SCNC: 25 MMOL/L (ref 20–33)
COLOR UR: YELLOW
CREAT SERPL-MCNC: 1 MG/DL (ref 0.5–1.4)
EOSINOPHIL # BLD AUTO: 0.21 K/UL (ref 0–0.51)
EOSINOPHIL NFR BLD: 3.5 % (ref 0–6.9)
EPI CELLS #/AREA URNS HPF: NEGATIVE /HPF
ERYTHROCYTE [DISTWIDTH] IN BLOOD BY AUTOMATED COUNT: 42.8 FL (ref 35.9–50)
ERYTHROCYTE [SEDIMENTATION RATE] IN BLOOD BY WESTERGREN METHOD: 7 MM/HOUR (ref 0–20)
FASTING STATUS PATIENT QL REPORTED: NORMAL
GLOBULIN SER CALC-MCNC: 3 G/DL (ref 1.9–3.5)
GLUCOSE SERPL-MCNC: 89 MG/DL (ref 65–99)
GLUCOSE UR STRIP.AUTO-MCNC: NEGATIVE MG/DL
HCT VFR BLD AUTO: 39.9 % (ref 37–47)
HGB BLD-MCNC: 12.9 G/DL (ref 12–16)
HYALINE CASTS #/AREA URNS LPF: ABNORMAL /LPF
IMM GRANULOCYTES # BLD AUTO: 0.01 K/UL (ref 0–0.11)
IMM GRANULOCYTES NFR BLD AUTO: 0.2 % (ref 0–0.9)
KETONES UR STRIP.AUTO-MCNC: NEGATIVE MG/DL
LEUKOCYTE ESTERASE UR QL STRIP.AUTO: NEGATIVE
LYMPHOCYTES # BLD AUTO: 2.3 K/UL (ref 1–4.8)
LYMPHOCYTES NFR BLD: 38.7 % (ref 22–41)
MCH RBC QN AUTO: 30.4 PG (ref 27–33)
MCHC RBC AUTO-ENTMCNC: 32.3 G/DL (ref 33.6–35)
MCV RBC AUTO: 93.9 FL (ref 81.4–97.8)
MICRO URNS: ABNORMAL
MONOCYTES # BLD AUTO: 0.41 K/UL (ref 0–0.85)
MONOCYTES NFR BLD AUTO: 6.9 % (ref 0–13.4)
NEUTROPHILS # BLD AUTO: 2.98 K/UL (ref 2–7.15)
NEUTROPHILS NFR BLD: 50 % (ref 44–72)
NITRITE UR QL STRIP.AUTO: NEGATIVE
NRBC # BLD AUTO: 0 K/UL
NRBC BLD-RTO: 0 /100 WBC
PH UR STRIP.AUTO: 5.5 [PH]
PLATELET # BLD AUTO: 215 K/UL (ref 164–446)
PMV BLD AUTO: 11 FL (ref 9–12.9)
POTASSIUM SERPL-SCNC: 4.3 MMOL/L (ref 3.6–5.5)
PROT SERPL-MCNC: 6.9 G/DL (ref 6–8.2)
PROT UR QL STRIP: NEGATIVE MG/DL
RBC # BLD AUTO: 4.25 M/UL (ref 4.2–5.4)
RBC # URNS HPF: ABNORMAL /HPF
RBC UR QL AUTO: ABNORMAL
SODIUM SERPL-SCNC: 135 MMOL/L (ref 135–145)
SP GR UR STRIP.AUTO: 1.02
UROBILINOGEN UR STRIP.AUTO-MCNC: 0.2 MG/DL
WBC # BLD AUTO: 6 K/UL (ref 4.8–10.8)
WBC #/AREA URNS HPF: ABNORMAL /HPF

## 2018-10-15 PROCEDURE — 84443 ASSAY THYROID STIM HORMONE: CPT

## 2018-10-15 PROCEDURE — 85652 RBC SED RATE AUTOMATED: CPT

## 2018-10-15 PROCEDURE — 85025 COMPLETE CBC W/AUTO DIFF WBC: CPT

## 2018-10-15 PROCEDURE — 81001 URINALYSIS AUTO W/SCOPE: CPT

## 2018-10-15 PROCEDURE — 36415 COLL VENOUS BLD VENIPUNCTURE: CPT

## 2018-10-15 PROCEDURE — 80053 COMPREHEN METABOLIC PANEL: CPT

## 2018-10-16 ENCOUNTER — OFFICE VISIT (OUTPATIENT)
Dept: MEDICAL GROUP | Facility: MEDICAL CENTER | Age: 43
End: 2018-10-16
Payer: COMMERCIAL

## 2018-10-16 VITALS
DIASTOLIC BLOOD PRESSURE: 52 MMHG | TEMPERATURE: 98.1 F | WEIGHT: 132 LBS | OXYGEN SATURATION: 96 % | SYSTOLIC BLOOD PRESSURE: 90 MMHG | HEART RATE: 86 BPM | BODY MASS INDEX: 21.99 KG/M2 | HEIGHT: 65 IN

## 2018-10-16 DIAGNOSIS — E78.5 DYSLIPIDEMIA: Chronic | ICD-10-CM

## 2018-10-16 DIAGNOSIS — R53.83 OTHER FATIGUE: ICD-10-CM

## 2018-10-16 DIAGNOSIS — F41.8 DEPRESSION WITH ANXIETY: ICD-10-CM

## 2018-10-16 DIAGNOSIS — Z00.00 PHYSICAL EXAM, ANNUAL: ICD-10-CM

## 2018-10-16 LAB
INT CON NEG: NORMAL
INT CON POS: NORMAL
S PYO AG THROAT QL: NORMAL
TSH SERPL DL<=0.005 MIU/L-ACNC: 2.52 UIU/ML (ref 0.38–5.33)

## 2018-10-16 PROCEDURE — 99396 PREV VISIT EST AGE 40-64: CPT | Performed by: NURSE PRACTITIONER

## 2018-10-16 PROCEDURE — 87880 STREP A ASSAY W/OPTIC: CPT | Performed by: NURSE PRACTITIONER

## 2018-10-16 RX ORDER — ALPRAZOLAM 0.5 MG/1
0.5 TABLET ORAL NIGHTLY PRN
Qty: 30 TAB | Refills: 0 | Status: SHIPPED | OUTPATIENT
Start: 2018-10-16 | End: 2018-11-15

## 2018-10-17 ENCOUNTER — TELEPHONE (OUTPATIENT)
Dept: URGENT CARE | Facility: CLINIC | Age: 43
End: 2018-10-17

## 2018-10-17 NOTE — PROGRESS NOTES
Subjective:      Jing Rojas is a 42 y.o. female who presents with Annual Exam            HPI  Seen in f/u for PE.  She has been fatigued recently.  Was seen in .  They ordered lab.    Reviewed lab with pt.  Her UA, CMP, GFR, ESR, CBC is wnl.  She hasn't had LP, D or B12.     SHE remains active but not recently d/t the fatigue.  She has been getting URI over and over again.  Currently doing better.  No URI now.  No current fever, sweating.  She feels like she is getting sick with her chest all the time.  No cough.  No sinus congestion. + sore throat recently. + chills.  Aching all over.  She has lost 5 lbs w/o changing diet.  Normally she is always 130 lbs.    She needs meds refilled.  She is on zoloft and xanax for anxiety.      Patient Active Problem List    Diagnosis Date Noted   • Decreased GFR 09/25/2017   • Basal cell carcinoma 02/13/2017   • HSV infection 06/27/2014   • Dyslipidemia 08/17/2011   • Insomnia 11/25/2009   • Acne vulgaris 06/28/2009   • History of depression 06/28/2009   • Preventative health care 06/28/2009     Current Outpatient Prescriptions   Medication Sig Dispense Refill   • ALPRAZolam (XANAX) 0.5 MG Tab Take 0.5 mg by mouth at bedtime as needed for Sleep.     • famciclovir (FAMVIR) 500 MG Tab Take 1 Tab by mouth 2 Times a Day. 8 Tab 6   • drospirenone-ethinyl estradiol (MARION) 3-0.02 MG per tablet Take 1 Tab by mouth every day. 28 Tab 3   • sertraline (ZOLOFT) 50 MG TABS Take 1 Tab by mouth every day. 30 Tab 11     No current facility-administered medications for this visit.      No Known Allergies        ROS    Review of Systems   Constitutional: Negative.  Negative for fever, weight loss, diaphoresis.   HENT: Negative.  Negative for hearing loss, ear pain, nosebleeds, neck pain, tinnitus and ear discharge.    Eyes: Negative.  Negative for blurred vision, double vision, photophobia, pain, discharge and redness.   Respiratory: Negative.  Negative for cough, hemoptysis, sputum production,  "shortness of breath, wheezing and stridor.    Cardiovascular: Negative.  Negative for chest pain, palpitations, orthopnea, claudication, leg swelling and PND.   Gastrointestinal: Negative.  Negative for heartburn, nausea, vomiting, abdominal pain, diarrhea, blood in stool and melena.   Genitourinary: Negative.  Negative for dysuria, urgency, frequency, incontinence, hematuria and flank pain.  + constipation.    Musculoskeletal: Negative.  Negative for back pain, joint pain and falls.   Skin: Negative.  Negative for itching and rash. followed by derm  Neurological: Negative.  Negative for dizziness, tingling, tremors, sensory change, speech change, focal weakness, seizures, loss of consciousness, weakness and headaches.   Endo/Heme/Allergies: Negative.  Negative for environmental allergies and polydipsia. Does bruise/bleed easily.  Having some sweating at nite.     Psychiatric/Behavioral: Negative.  Negative for depression, suicidal ideas, hallucinations, memory loss and substance abuse. does not have insomnia.    All other systems reviewed and are negative.         Objective:     BP (!) 90/52 (BP Location: Right arm, Patient Position: Sitting)   Pulse 86   Temp 36.7 °C (98.1 °F) (Temporal)   Ht 1.651 m (5' 5\")   Wt 59.9 kg (132 lb)   LMP 10/13/2018   SpO2 96%   Breastfeeding? No   BMI 21.97 kg/m²      Physical Exam      Physical Exam   Vitals reviewed.  Constitutional: oriented to person, place, and time. appears well-developed and well-nourished. No distress.   HENT: Head: Normocephalic and atraumatic. Bilateral tympanic membranes wnl w/o bulging.  Right Ear: External ear normal. Left Ear: External ear normal. Nose: Nose normal.  Mouth/Throat: Oropharynx is clear and moist. No oropharyngeal exudate but with + erythema.  amanda tm wnl. Eyes: Conjunctivae and EOM are normal. Pupils are equal, round, and reactive to light. Right eye exhibits no discharge. Left eye exhibits no discharge. No scleral icterus.  "   Neck: Normal range of motion. Neck supple. No JVD present.   Cardiovascular: Normal rate, regular rhythm, normal heart sounds and intact distal pulses.  Exam reveals no gallop and no friction rub.  No murmur heard.  No carotid bruits   Pulmonary/Chest: Effort normal and breath sounds normal. No stridor. No respiratory distress. no wheezes or rales. exhibits no tenderness.   Abdominal: Soft. Bowel sounds are normal. exhibits no distension and no mass. No tenderness. no rebound and no guarding.   Musculoskeletal: Normal range of motion. exhibits no edema or tenderness.  amanda pedal pulses 2+.  Lymphadenopathy:  no cervical or supraclavicular adenopathy.   Neurological: alert and oriented to person, place, and time. has normal reflexes. displays normal reflexes. No cranial nerve deficit. exhibits normal muscle tone. Coordination normal.   Skin: Skin is warm and dry. No rash noted. no diaphoresis. No erythema. No pallor.   Psychiatric: normal mood and affect. behavior is normal.            Assessment/Plan:     1. Physical exam, annual      do lab and f/u 1 month for review   2. Depression with anxiety  sertraline (ZOLOFT) 50 MG Tab    ALPRAZolam (XANAX) 0.5 MG Tab    refilled meds   3. Other fatigue  VITAMIN D,25 HYDROXY    VITAMIN B12    FOLATE    NANCY REFLEXIVE PROFILE    BASIC METABOLIC PANEL    ESTRADIOL    check for other causes of fatigue since lab done by  in unrevealing   4. Dyslipidemia  LIPID PROFILE   5.  Strep in office negative.

## 2018-11-12 ENCOUNTER — HOSPITAL ENCOUNTER (OUTPATIENT)
Dept: LAB | Facility: MEDICAL CENTER | Age: 43
End: 2018-11-12
Attending: NURSE PRACTITIONER
Payer: COMMERCIAL

## 2018-11-12 DIAGNOSIS — E78.5 DYSLIPIDEMIA: Chronic | ICD-10-CM

## 2018-11-12 DIAGNOSIS — R53.83 OTHER FATIGUE: ICD-10-CM

## 2018-11-12 LAB
25(OH)D3 SERPL-MCNC: 28 NG/ML (ref 30–100)
ANION GAP SERPL CALC-SCNC: 7 MMOL/L (ref 0–11.9)
BUN SERPL-MCNC: 15 MG/DL (ref 8–22)
CALCIUM SERPL-MCNC: 9.2 MG/DL (ref 8.5–10.5)
CHLORIDE SERPL-SCNC: 105 MMOL/L (ref 96–112)
CHOLEST SERPL-MCNC: 229 MG/DL (ref 100–199)
CO2 SERPL-SCNC: 27 MMOL/L (ref 20–33)
CREAT SERPL-MCNC: 0.97 MG/DL (ref 0.5–1.4)
ESTRADIOL SERPL-MCNC: 117 PG/ML
FASTING STATUS PATIENT QL REPORTED: NORMAL
FOLATE SERPL-MCNC: 23.2 NG/ML
GLUCOSE SERPL-MCNC: 83 MG/DL (ref 65–99)
HDLC SERPL-MCNC: 101 MG/DL
LDLC SERPL CALC-MCNC: 113 MG/DL
POTASSIUM SERPL-SCNC: 4.1 MMOL/L (ref 3.6–5.5)
SODIUM SERPL-SCNC: 139 MMOL/L (ref 135–145)
TRIGL SERPL-MCNC: 75 MG/DL (ref 0–149)
VIT B12 SERPL-MCNC: 683 PG/ML (ref 211–911)

## 2018-11-12 PROCEDURE — 82670 ASSAY OF TOTAL ESTRADIOL: CPT

## 2018-11-12 PROCEDURE — 86038 ANTINUCLEAR ANTIBODIES: CPT

## 2018-11-12 PROCEDURE — 80061 LIPID PANEL: CPT

## 2018-11-12 PROCEDURE — 82607 VITAMIN B-12: CPT

## 2018-11-12 PROCEDURE — 82746 ASSAY OF FOLIC ACID SERUM: CPT

## 2018-11-12 PROCEDURE — 80048 BASIC METABOLIC PNL TOTAL CA: CPT

## 2018-11-12 PROCEDURE — 36415 COLL VENOUS BLD VENIPUNCTURE: CPT

## 2018-11-12 PROCEDURE — 82306 VITAMIN D 25 HYDROXY: CPT

## 2018-11-13 LAB — NUCLEAR IGG SER QL IA: NORMAL

## 2018-11-19 ENCOUNTER — TELEPHONE (OUTPATIENT)
Dept: MEDICAL GROUP | Facility: MEDICAL CENTER | Age: 43
End: 2018-11-19

## 2018-11-19 NOTE — LETTER
November 19, 2018        Jing Rojas  [address] [facility] [address]        Dear Jing:    After careful review of your chart, we have noted you are due for a follow up appointment.  We request you call our office at [phone] at your earliest convenience and make an appointment.      We look forward to scheduling an appointment for you, so that we may provide you with the safest and most complete medical care.        If you have any questions or concerns, please don't hesitate to call.        Sincerely,        Azael Nunes M.D.    Electronically Signed

## 2018-11-19 NOTE — TELEPHONE ENCOUNTER
----- Message from TARAH Dsouza sent at 11/18/2018  2:17 PM PST -----  Please have pt set appointment to review and discuss treatment for labs.

## 2019-05-22 ENCOUNTER — HOSPITAL ENCOUNTER (OUTPATIENT)
Dept: RADIOLOGY | Facility: MEDICAL CENTER | Age: 44
End: 2019-05-22
Attending: CHIROPRACTOR
Payer: COMMERCIAL

## 2019-05-22 DIAGNOSIS — M54.2 CERVICALGIA: ICD-10-CM

## 2019-05-22 PROCEDURE — 72050 X-RAY EXAM NECK SPINE 4/5VWS: CPT

## 2019-08-26 ENCOUNTER — OFFICE VISIT (OUTPATIENT)
Dept: MEDICAL GROUP | Facility: MEDICAL CENTER | Age: 44
End: 2019-08-26
Payer: COMMERCIAL

## 2019-08-26 VITALS
WEIGHT: 134 LBS | BODY MASS INDEX: 22.33 KG/M2 | DIASTOLIC BLOOD PRESSURE: 60 MMHG | SYSTOLIC BLOOD PRESSURE: 108 MMHG | HEART RATE: 61 BPM | TEMPERATURE: 99.7 F | OXYGEN SATURATION: 99 % | HEIGHT: 65 IN

## 2019-08-26 DIAGNOSIS — F41.8 DEPRESSION WITH ANXIETY: ICD-10-CM

## 2019-08-26 DIAGNOSIS — F51.01 PRIMARY INSOMNIA: ICD-10-CM

## 2019-08-26 DIAGNOSIS — Z00.00 PREVENTATIVE HEALTH CARE: Chronic | ICD-10-CM

## 2019-08-26 DIAGNOSIS — Z86.59 HISTORY OF DEPRESSION: ICD-10-CM

## 2019-08-26 PROCEDURE — 99396 PREV VISIT EST AGE 40-64: CPT | Performed by: INTERNAL MEDICINE

## 2019-08-26 RX ORDER — SERTRALINE HYDROCHLORIDE 100 MG/1
100 TABLET, FILM COATED ORAL DAILY
Qty: 90 TAB | Refills: 1 | Status: SHIPPED | OUTPATIENT
Start: 2019-08-26 | End: 2020-09-13 | Stop reason: SDUPTHER

## 2019-08-26 RX ORDER — TEMAZEPAM 15 MG/1
15 CAPSULE ORAL NIGHTLY PRN
Qty: 30 CAP | Refills: 1 | Status: SHIPPED
Start: 2019-09-26 | End: 2019-10-26

## 2019-08-26 RX ORDER — ALPRAZOLAM 0.5 MG/1
0.5 TABLET ORAL NIGHTLY PRN
Qty: 30 TAB | Refills: 1 | Status: SHIPPED
Start: 2019-08-26 | End: 2019-09-26

## 2019-08-26 ASSESSMENT — PATIENT HEALTH QUESTIONNAIRE - PHQ9: CLINICAL INTERPRETATION OF PHQ2 SCORE: 0

## 2019-08-26 NOTE — PROGRESS NOTES
Subjective:      Jing COLLINS is a 43 y.o. female who presents with Annual Exam            HPI     Here for annual exam medication, allergies, medical history, surgical history, social history, family history reviewed and updated  Remains on Zoloft decreasing dose to 50 mg no change in mood.  Try tapering off totally but had some anxiety so she is back on the medication and her mood has been fine.  Good social support with family, , children.  SH , teaches at middle school, exercises at [employer] 3-4 times per week, has two children and  has two children, diet is clean and eats healthy, now gluten free and improved GI tolerance on that diet, eating more chicken and fish. Tries to limit sweets and candies. No regular etoh, no tobacco  Family history no changes  Insomnia on restoril and xanax alternating, takes restoril infrequently 2 nights per week and xanax infrequently not use nightly, typically once per week, not take concurrently no drowsiness or hangover effect with medications.  No depression.      Current Outpatient Medications   Medication Sig Dispense Refill   • sertraline (ZOLOFT) 50 MG Tab Take 1 Tab by mouth every day. 90 Tab 3   • famciclovir (FAMVIR) 500 MG Tab Take 1 Tab by mouth 2 Times a Day. 8 Tab 6   • drospirenone-ethinyl estradiol (MARION) 3-0.02 MG per tablet Take 1 Tab by mouth every day. 28 Tab 3     No current facility-administered medications for this visit.      Patient Active Problem List   Diagnosis   • History of depression   • Preventative health care   • Insomnia   • Dyslipidemia   • Basal cell carcinoma   • Decreased GFR     Depression Screening    Little interest or pleasure in doing things?  0 - not at all  Feeling down, depressed , or hopeless? 0 - not at all  Patient Health Questionnaire Score: 0            Health Maintenance Summary                PAP SMEAR Overdue 11/20/1996     MAMMOGRAM Overdue 9/13/2018      Done 9/13/2017 MA-MAMMO SCREENING BILAT  "W/IMPLANTS W/STEFANY W/CAD    IMM INFLUENZA Next Due 9/1/2019      Done 1/23/2018 Ext Imm: Influenza Quad Inj P     Patient has more history with this topic...    IMM DTaP/Tdap/Td Vaccine Next Due 2/14/2023      Done 2/14/2013 Imm Admin: Tdap Vaccine     Patient has more history with this topic...          Patient Care Team:  Azael Nunes M.D. as PCP - General    ROS       Objective:     /60 (BP Location: Right arm, Patient Position: Sitting, BP Cuff Size: Adult)   Pulse 61   Temp 37.6 °C (99.7 °F)   Ht 1.651 m (5' 5\")   Wt 60.8 kg (134 lb)   SpO2 99%   BMI 22.30 kg/m²      Physical Exam   Constitutional: She appears well-developed and well-nourished. No distress.   HENT:   Head: Normocephalic and atraumatic.   Right Ear: External ear normal.   Left Ear: External ear normal.   Mouth/Throat: Oropharynx is clear and moist.   Eyes: Conjunctivae are normal. Right eye exhibits no discharge.   Neck: Neck supple. No JVD present. No thyromegaly present.   Cardiovascular: Normal rate, regular rhythm and normal heart sounds.   Pulmonary/Chest: Effort normal and breath sounds normal.   Musculoskeletal: She exhibits no edema.   Neurological: She is alert.   Skin: Skin is warm. She is not diaphoretic.   Psychiatric: She has a normal mood and affect. Her behavior is normal.   Nursing note and vitals reviewed.              Assessment/Plan:     Assessment  #! Annual exam    #2  Chronic insomnia, on temazepam twice per week and Xanax once per week, does not take concurrently, no daytime drowsiness or hangover effect with medication.  No change in mood.    #3 history of anxiety stable on Zoloft 50 mg unable to taper, stable and controlled on the 50 mg dose    #4 dyslipidemia total cholesterol 229, ,  in November    #5 low vitamin D 28    #6 preventative health per gynecology        Plan  #! Start vit d 2000 units daily    #2 follow up gyn    #3 reviewed laboratory testing from November 12, 2018, cholesterol " panel is excellent with elevated HDL and LDL is stable no medications are necessary, no history of hypertension, diabetes, tobacco    #4 nutrition, diet, exercise discussed, continue her good work on routine    #5 sleep hygiene discussed, long-term use of benzodiazepines can increase risk for dependence, drowsiness, sedation, dementia, do not take Restoril and Xanax concurrently, she understands, and will work on continuing to improve sleep hygiene, possibly adding meditation into her regimen    #6 continue gluten-free diet, continue limit alcohol    #7 follow-up 1 year

## 2020-03-30 ENCOUNTER — TELEPHONE (OUTPATIENT)
Dept: MEDICAL GROUP | Facility: MEDICAL CENTER | Age: 45
End: 2020-03-30

## 2020-03-30 RX ORDER — FAMCICLOVIR 250 MG/1
500 TABLET ORAL 3 TIMES DAILY PRN
Qty: 150 TAB | Refills: 6 | Status: SHIPPED | OUTPATIENT
Start: 2020-03-30 | End: 2022-01-06

## 2020-03-30 NOTE — TELEPHONE ENCOUNTER
Pt Rx sent in for Famcyclovir for 500 mg.This is currently unavailable. Would like you to send Rx for 250 mg instead.       Jing COLLINS  [phone]

## 2020-07-15 ENCOUNTER — APPOINTMENT (RX ONLY)
Dept: URBAN - METROPOLITAN AREA CLINIC 35 | Facility: CLINIC | Age: 45
Setting detail: DERMATOLOGY
End: 2020-07-15

## 2020-07-15 DIAGNOSIS — Z85.828 PERSONAL HISTORY OF OTHER MALIGNANT NEOPLASM OF SKIN: ICD-10-CM

## 2020-07-15 DIAGNOSIS — L82.1 OTHER SEBORRHEIC KERATOSIS: ICD-10-CM

## 2020-07-15 PROCEDURE — 99202 OFFICE O/P NEW SF 15 MIN: CPT

## 2020-07-15 PROCEDURE — ? COUNSELING

## 2020-07-15 ASSESSMENT — LOCATION SIMPLE DESCRIPTION DERM
LOCATION SIMPLE: RIGHT FOREHEAD
LOCATION SIMPLE: RIGHT SCALP

## 2020-07-15 ASSESSMENT — LOCATION DETAILED DESCRIPTION DERM
LOCATION DETAILED: RIGHT SUPERIOR LATERAL FOREHEAD
LOCATION DETAILED: RIGHT CENTRAL FRONTAL SCALP

## 2020-07-15 ASSESSMENT — LOCATION ZONE DERM
LOCATION ZONE: FACE
LOCATION ZONE: SCALP

## 2020-08-23 ENCOUNTER — TELEPHONE (OUTPATIENT)
Dept: MEDICAL GROUP | Facility: MEDICAL CENTER | Age: 45
End: 2020-08-23

## 2020-08-23 DIAGNOSIS — G47.00 INSOMNIA, UNSPECIFIED TYPE: Chronic | ICD-10-CM

## 2020-08-23 RX ORDER — ALPRAZOLAM 0.5 MG/1
TABLET ORAL
Qty: 30 TAB | Refills: 0 | Status: SHIPPED | OUTPATIENT
Start: 2020-08-23 | End: 2020-10-05

## 2020-08-23 RX ORDER — TEMAZEPAM 15 MG/1
CAPSULE ORAL
Qty: 30 CAP | Refills: 1 | Status: SHIPPED | OUTPATIENT
Start: 2020-08-23 | End: 2020-10-12

## 2020-09-13 ENCOUNTER — TELEPHONE (OUTPATIENT)
Dept: MEDICAL GROUP | Facility: MEDICAL CENTER | Age: 45
End: 2020-09-13

## 2020-09-13 RX ORDER — SERTRALINE HYDROCHLORIDE 100 MG/1
100 TABLET, FILM COATED ORAL DAILY
Qty: 90 TAB | Refills: 0 | Status: SHIPPED | OUTPATIENT
Start: 2020-09-13 | End: 2020-12-30 | Stop reason: SDUPTHER

## 2020-09-15 ENCOUNTER — HOSPITAL ENCOUNTER (OUTPATIENT)
Dept: LAB | Facility: MEDICAL CENTER | Age: 45
End: 2020-09-15
Payer: COMMERCIAL

## 2020-09-15 LAB — COVID ORDER STATUS COVID19: NORMAL

## 2020-09-16 LAB
SARS-COV-2 RNA RESP QL NAA+PROBE: NOTDETECTED
SPECIMEN SOURCE: NORMAL

## 2020-09-18 ENCOUNTER — NURSE TRIAGE (OUTPATIENT)
Dept: HEALTH INFORMATION MANAGEMENT | Facility: OTHER | Age: 45
End: 2020-09-18

## 2020-09-18 ENCOUNTER — OFFICE VISIT (OUTPATIENT)
Dept: URGENT CARE | Facility: CLINIC | Age: 45
End: 2020-09-18
Payer: COMMERCIAL

## 2020-09-18 VITALS
WEIGHT: 130 LBS | HEART RATE: 75 BPM | BODY MASS INDEX: 21.66 KG/M2 | SYSTOLIC BLOOD PRESSURE: 102 MMHG | HEIGHT: 65 IN | TEMPERATURE: 98.4 F | DIASTOLIC BLOOD PRESSURE: 68 MMHG | OXYGEN SATURATION: 100 % | RESPIRATION RATE: 18 BRPM

## 2020-09-18 DIAGNOSIS — J02.9 PHARYNGITIS, UNSPECIFIED ETIOLOGY: ICD-10-CM

## 2020-09-18 DIAGNOSIS — R51.9 NONINTRACTABLE HEADACHE, UNSPECIFIED CHRONICITY PATTERN, UNSPECIFIED HEADACHE TYPE: ICD-10-CM

## 2020-09-18 DIAGNOSIS — R09.82 PND (POST-NASAL DRIP): ICD-10-CM

## 2020-09-18 LAB
FLUAV+FLUBV AG SPEC QL IA: NEGATIVE
INT CON NEG: NEGATIVE
INT CON NEG: NEGATIVE
INT CON POS: POSITIVE
INT CON POS: POSITIVE
S PYO AG THROAT QL: NEGATIVE

## 2020-09-18 PROCEDURE — 99213 OFFICE O/P EST LOW 20 MIN: CPT | Performed by: PHYSICIAN ASSISTANT

## 2020-09-18 PROCEDURE — 87804 INFLUENZA ASSAY W/OPTIC: CPT | Performed by: PHYSICIAN ASSISTANT

## 2020-09-18 PROCEDURE — 87880 STREP A ASSAY W/OPTIC: CPT | Performed by: PHYSICIAN ASSISTANT

## 2020-09-18 ASSESSMENT — ENCOUNTER SYMPTOMS
SPUTUM PRODUCTION: 0
HEADACHES: 1
COUGH: 0
SORE THROAT: 1
WHEEZING: 0
SHORTNESS OF BREATH: 0
VOMITING: 0
ABDOMINAL PAIN: 0
CHILLS: 0
SINUS PAIN: 0
MYALGIAS: 0
FEVER: 0
NAUSEA: 0
DIARRHEA: 0

## 2020-09-18 ASSESSMENT — FIBROSIS 4 INDEX: FIB4 SCORE: 1.02

## 2020-09-18 NOTE — TELEPHONE ENCOUNTER
1. Caller Name:Jing COLLINS                Call Back Number:   [facility] PCP or Specialty Provider: Yes         2.  In the last two weeks, has the patient had any new or worsening symptoms (not explained by alternative diagnosis)? Yes, the patient reports the following COVID-19 consistent symptoms: shortness of breath or difficulty breathing, sore throat, headache and nausea, a week. Covid negative 9/15.     3.  Does patient have any comoribidities? None     4.  Has the patient traveled in the last 14 days OR had any known contact with someone who is suspected or confirmed to have COVID-19?  No.    5. Disposition: Cleared by RN Triage as potential is low for COVID-19; OK to keep/schedule appointment    Note routed to [facility] Provider: SHEREEN only.

## 2020-09-18 NOTE — PROGRESS NOTES
"Subjective:   Jing COLLINS  is a 44 y.o. female who presents for Sore Throat (headache, weakness, x6 days. Recently tested negative for COVID)      HPI    Patient comes clinic noting last 5 or 6 days of symptoms of headache and generalized fatigue and weakness.  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  She did contact her nurse/health department through school who arranged a COVID test that was completed and found to be -2 days ago.  Patient comes to clinic today complaining of persistent sore throat, congestion and headache.  She denies fevers chills or cough.  She denies ear pain.  She has had a number of moments of nausea but denies vomiting abdominal pain diarrhea or rash.  Denies dysuria frequency urgency or hematuria.  Has contacted primary care and has appointment for next Thursday.  She is a schoolteacher and comes to clinic to \"try to find anything\" that might be contributing to symptoms before Monday.  She denies specific exposures to the students that of been sick, individuals known to have strep or COVID.  She denies fever or cough.  She denies history of asthma or pneumonia.  She denies any history of seasonal allergies.    Review of Systems   Constitutional: Positive for malaise/fatigue. Negative for chills and fever.   HENT: Positive for congestion and sore throat. Negative for ear pain and sinus pain.    Respiratory: Negative for cough, sputum production, shortness of breath and wheezing.    Gastrointestinal: Negative for abdominal pain, diarrhea, nausea and vomiting.   Musculoskeletal: Negative for myalgias.   Skin: Negative for rash.   Neurological: Positive for headaches.   Endo/Heme/Allergies: Negative for environmental allergies.       No Known Allergies     Objective:   /68   Pulse 75   Temp 36.9 °C (98.4 °F)   Resp 18   Ht 1.651 m (5' 5\")   Wt 59 kg (130 lb)   LMP 09/04/2020 (Exact Date)   SpO2 100%   Breastfeeding No   BMI 21.63 " kg/m²     Physical Exam  Vitals signs and nursing note reviewed.   Constitutional:       General: She is not in acute distress.     Appearance: She is well-developed. She is not diaphoretic.   HENT:      Head: Normocephalic and atraumatic.      Right Ear: Tympanic membrane, ear canal and external ear normal.      Left Ear: Tympanic membrane, ear canal and external ear normal.      Nose: Nose normal.      Mouth/Throat:      Lips: Pink.      Mouth: Mucous membranes are moist.      Pharynx: Uvula midline. Posterior oropharyngeal erythema ( moderate PND ) present. No oropharyngeal exudate.      Tonsils: No tonsillar exudate or tonsillar abscesses.   Eyes:      General: Lids are normal. No scleral icterus.        Right eye: No discharge.         Left eye: No discharge.      Conjunctiva/sclera: Conjunctivae normal.   Neck:      Musculoskeletal: Neck supple.   Pulmonary:      Effort: Pulmonary effort is normal. No respiratory distress.      Breath sounds: No decreased breath sounds, wheezing, rhonchi or rales.   Musculoskeletal: Normal range of motion.   Lymphadenopathy:      Cervical: No cervical adenopathy.   Skin:     General: Skin is warm and dry.      Coloration: Skin is not pale.      Findings: No erythema.   Neurological:      Mental Status: She is alert and oriented to person, place, and time. She is not disoriented.   Psychiatric:         Speech: Speech normal.         Behavior: Behavior normal.     POCT strep - NEG  POCT Flu - NEG    COVID test resulted as NEG two days ago    Assessment/Plan:   1. Pharyngitis, unspecified etiology  - POCT Rapid Strep A    2. PND (post-nasal drip)    3. Nonintractable headache, unspecified chronicity pattern, unspecified headache type  Supportive care is reviewed with patient/caregiver - recommend to push PO fluids and electrolytes, sent w/ handout regarding COVID (if still symptomatic and should continue to stay home), normal vitals, negative for strep and flu, PND on exam,  Return to clinic with lack of resolution or progression of symptoms.  ER precautions with any worsening symptoms are reviewed with patient/caregiver and they do express understanding  F/u w/ PCP this coming Thursday.  I have worn an N95 mask, gloves and eye protection for the entire encounter with this patient.     Differential diagnosis, natural history, supportive care, and indications for immediate follow-up discussed.

## 2020-09-18 NOTE — LETTER
September 18, 2020       Patient: Jing COLLINS   YOB: 1975   Date of Visit: 9/18/2020       To Whom it May Concern,   Your employee was seen in our clinic today. A concern for COVID-19 has been identified and testing is in progress.?   ?  We are asking you to excuse absences while following self-isolation protocol per Center for Disease Control (CDC) guidelines. Your employee will be able to access test results through our electronic delivery system called [employer].?   ?  If the results of testing are negative, and once there has been no fever (temperature >100.4 F) for at least 72 hours without treatment, and no vomiting or diarrhea for at least 48 hours, then return to work is approved.   ?  If the results of testing are positive then your employee will be contacted by the [facility] or Critical access hospital department for further instructions on duration of self-isolation and return to work protocol. In general, this will also follow the CDC guidelines with a minimum of 10 days from the onset of symptoms and without fever, vomiting, or diarrhea as above.?   ?  In general, repeat testing is not necessary and not offered through our [facility] care.?   ?  This is the only note that will be provided from [facility] for this visit. Your employee will require an appointment with a primary care provider if FMLA or disability forms are required.   ?  Sincerely,?         Ronny Platt P.A.-C.  Electronically Signed

## 2020-09-18 NOTE — TELEPHONE ENCOUNTER
Regarding: flu like symptoms, headache, shaky, sore throat   ----- Message from Saranya Beach sent at 9/18/2020 10:35 AM PDT -----  Patient calling regarding flu like symptoms, headache, shaky, sore throat, had covid test on 9-15-20, neg result.

## 2020-09-21 ENCOUNTER — NURSE TRIAGE (OUTPATIENT)
Dept: HEALTH INFORMATION MANAGEMENT | Facility: OTHER | Age: 45
End: 2020-09-21

## 2020-09-21 NOTE — TELEPHONE ENCOUNTER
1. Caller Name: Jing Ritter              Call Back Number: [phone]  [facility] PCP or Specialty Provider: Yes Dr. Azael Nunes        2.  In the last two weeks, has the patient had any new or worsening symptoms (not explained by alternative diagnosis)? Yes, the patient reports the following COVID-19 consistent symptoms: muscle pain or body aches and headache , sore throat x 8 days, shakes . Covid 19 negative 9/15, Strep test negative 9/18.     3.  Does patient have any comoribidities? None     4.  Has the patient traveled in the last 14 days OR had any known contact with someone who is suspected or confirmed to have COVID-19?  No.    5. Disposition: Offered patient virtual visit to limit potential exposure to others; patient also given self care instructions     UC 9/18 but no blood work done at that time per patient. PCP appt is in 3 weeks yet.     Note routed to [facility] Provider: SHEREEN only.   Transferred  To  for  virtual today.

## 2020-09-21 NOTE — TELEPHONE ENCOUNTER
Regarding: sore throat, muscle or body aches, weakness  ----- Message from Yahaira Harmon sent at 9/21/2020  2:33 PM PDT -----  Pt symptoms are sore throat, muscle or body aches, weakness, pt stated had a covid testing done , negative, wants to schedule an appt with pcp.

## 2020-09-22 ENCOUNTER — OFFICE VISIT (OUTPATIENT)
Dept: MEDICAL GROUP | Facility: MEDICAL CENTER | Age: 45
End: 2020-09-22
Payer: COMMERCIAL

## 2020-09-22 VITALS
TEMPERATURE: 98.1 F | WEIGHT: 133 LBS | DIASTOLIC BLOOD PRESSURE: 62 MMHG | HEIGHT: 65 IN | SYSTOLIC BLOOD PRESSURE: 120 MMHG | BODY MASS INDEX: 22.16 KG/M2 | HEART RATE: 71 BPM | OXYGEN SATURATION: 100 %

## 2020-09-22 DIAGNOSIS — R51.9 ACUTE NONINTRACTABLE HEADACHE, UNSPECIFIED HEADACHE TYPE: ICD-10-CM

## 2020-09-22 DIAGNOSIS — Z00.00 ANNUAL PHYSICAL EXAM: ICD-10-CM

## 2020-09-22 DIAGNOSIS — R25.1 MUSCLE TREMOR: ICD-10-CM

## 2020-09-22 DIAGNOSIS — F41.9 ANXIETY: ICD-10-CM

## 2020-09-22 PROCEDURE — 99214 OFFICE O/P EST MOD 30 MIN: CPT | Performed by: FAMILY MEDICINE

## 2020-09-22 ASSESSMENT — PATIENT HEALTH QUESTIONNAIRE - PHQ9: CLINICAL INTERPRETATION OF PHQ2 SCORE: 0

## 2020-09-22 ASSESSMENT — FIBROSIS 4 INDEX: FIB4 SCORE: 1.02

## 2020-09-22 NOTE — TELEPHONE ENCOUNTER
This messages routed to me.  One message indicates this is just an FYI, another message is asking if the patient can schedule a virtual appointment with us?  I am not sure which is accurate.    Please check to see how the patient is doing.  If necessary we could possibly do a virtual appointment at 1240 Tuesday afternoon, I will need to check the schedule.

## 2020-09-22 NOTE — PROGRESS NOTES
"Subjective:   Jing COLLINS is a 44 y.o. female here today for headache and muscle shaking.    10 days ago she felt shaky in her legs and arms, plus a headache that has improved but still present, there was also a sore throat at that time. Symptoms started after a hard workout. There was a lot of smoke from [employer]. Had a negative COVID-19 test. The next day went to urgent care because still having headache, mild sore throat and muscles feel shaky, was negative for strep or influenza.  Waking up for the past week and one eye is dilated.  She is under stress as a . Has not been able to exercise regularly, which is stress reliever.    Restarted Zoloft 50 mg daily about 5 days ago, after these symptoms started.      Current medicines (including changes today)  Current Outpatient Medications   Medication Sig Dispense Refill   • sertraline (ZOLOFT) 100 MG Tab Take 1 Tab by mouth every day. 90 Tab 0   • temazepam (RESTORIL) 15 MG Cap TAKE ONE CAPSULE BY MOUTH EVERY NIGHT AT BEDTIME AS NEEDED FOR SLEEP *30 DAY SUPPLY * RESTORIL* 30 Cap 1   • ALPRAZolam (XANAX) 0.5 MG Tab TAKE ONE TABLET BY MOUTH EVERY NIGHT AT BEDTIME AS NEEDED FOR SLEEP *30 DAY SUPPLY * XANAX* 30 Tab 0   • famciclovir (FAMVIR) 250 MG Tab Take 2 Tabs by mouth 3 times a day as needed (x five days for flare up). 150 Tab 6     No current facility-administered medications for this visit.      She  has a past medical history of Acne (6/28/2009), Basal cell carcinoma (2/13/2017), Depression with anxiety, HSV-1 (herpes simplex virus 1) infection (6/28/2009), and Insomnia (11/25/2009).    ROS   No weight loss, no fever       Objective:     /62 (BP Location: Right arm, Patient Position: Sitting)   Pulse 71   Temp 36.7 °C (98.1 °F) (Temporal)   Ht 1.651 m (5' 5\")   Wt 60.3 kg (133 lb)   SpO2 100%  Body mass index is 22.13 kg/m².   Physical Exam:  Constitutional: Alert, no distress.  Skin: Warm, dry, good turgor, no rashes in visible " areas.  Eye: Equal, round and reactive, conjunctiva clear, lids normal.  Psych: Alert and oriented x3, normal affect and mood.        Assessment and Plan:   The following treatment plan was discussed    1. Muscle tremor  Uncertain etiology.  Patient feels like she has muscle fatigue.  Check labs including thyroid and CPK.  Follow-up with PCP as scheduled in 3 weeks.    2. Acute nonintractable headache, unspecified headache type  Possibly related to recent smoke.  Continue to monitor as symptoms seem to be improving.    3. Anxiety  Advised patient do trial of alprazolam in the evening if she gets more muscle fatigue to see if this is related to anxiety.  She does have a lot of stress I do agree with restarting sertraline 50 mg daily.  Follow-up with PCP in 3 weeks to review.    4. Annual physical exam  - CBC WITH DIFFERENTIAL; Future  - Comp Metabolic Panel; Future  - Lipid Profile; Future  - TSH WITH REFLEX TO FT4; Future  - VITAMIN B12; Future  - CRP QUANTITIVE (NON-CARDIAC); Future  - Sed Rate; Future  - CREATINE KINASE; Future        Followup: Return if symptoms worsen or fail to improve.

## 2020-10-06 ENCOUNTER — HOSPITAL ENCOUNTER (OUTPATIENT)
Dept: LAB | Facility: MEDICAL CENTER | Age: 45
End: 2020-10-06
Attending: FAMILY MEDICINE
Payer: COMMERCIAL

## 2020-10-06 DIAGNOSIS — Z00.00 ANNUAL PHYSICAL EXAM: ICD-10-CM

## 2020-10-06 LAB
ALBUMIN SERPL BCP-MCNC: 4.2 G/DL (ref 3.2–4.9)
ALBUMIN/GLOB SERPL: 1.4 G/DL
ALP SERPL-CCNC: 33 U/L (ref 30–99)
ALT SERPL-CCNC: 17 U/L (ref 2–50)
ANION GAP SERPL CALC-SCNC: 11 MMOL/L (ref 7–16)
AST SERPL-CCNC: 22 U/L (ref 12–45)
BASOPHILS # BLD AUTO: 0.7 % (ref 0–1.8)
BASOPHILS # BLD: 0.05 K/UL (ref 0–0.12)
BILIRUB SERPL-MCNC: 0.5 MG/DL (ref 0.1–1.5)
BUN SERPL-MCNC: 13 MG/DL (ref 8–22)
CALCIUM SERPL-MCNC: 9.5 MG/DL (ref 8.5–10.5)
CHLORIDE SERPL-SCNC: 103 MMOL/L (ref 96–112)
CHOLEST SERPL-MCNC: 200 MG/DL (ref 100–199)
CK SERPL-CCNC: 86 U/L (ref 0–154)
CO2 SERPL-SCNC: 24 MMOL/L (ref 20–33)
CREAT SERPL-MCNC: 1.06 MG/DL (ref 0.5–1.4)
CRP SERPL HS-MCNC: 0.36 MG/DL (ref 0–0.75)
EOSINOPHIL # BLD AUTO: 0.11 K/UL (ref 0–0.51)
EOSINOPHIL NFR BLD: 1.6 % (ref 0–6.9)
ERYTHROCYTE [DISTWIDTH] IN BLOOD BY AUTOMATED COUNT: 41.7 FL (ref 35.9–50)
ERYTHROCYTE [SEDIMENTATION RATE] IN BLOOD BY WESTERGREN METHOD: 5 MM/HOUR (ref 0–20)
FASTING STATUS PATIENT QL REPORTED: NORMAL
GLOBULIN SER CALC-MCNC: 3 G/DL (ref 1.9–3.5)
GLUCOSE SERPL-MCNC: 87 MG/DL (ref 65–99)
HCT VFR BLD AUTO: 41.3 % (ref 37–47)
HDLC SERPL-MCNC: 91 MG/DL
HGB BLD-MCNC: 13.9 G/DL (ref 12–16)
IMM GRANULOCYTES # BLD AUTO: 0.02 K/UL (ref 0–0.11)
IMM GRANULOCYTES NFR BLD AUTO: 0.3 % (ref 0–0.9)
LDLC SERPL CALC-MCNC: 73 MG/DL
LYMPHOCYTES # BLD AUTO: 2.23 K/UL (ref 1–4.8)
LYMPHOCYTES NFR BLD: 33.2 % (ref 22–41)
MCH RBC QN AUTO: 31.4 PG (ref 27–33)
MCHC RBC AUTO-ENTMCNC: 33.7 G/DL (ref 33.6–35)
MCV RBC AUTO: 93.2 FL (ref 81.4–97.8)
MONOCYTES # BLD AUTO: 0.45 K/UL (ref 0–0.85)
MONOCYTES NFR BLD AUTO: 6.7 % (ref 0–13.4)
NEUTROPHILS # BLD AUTO: 3.86 K/UL (ref 2–7.15)
NEUTROPHILS NFR BLD: 57.5 % (ref 44–72)
NRBC # BLD AUTO: 0 K/UL
NRBC BLD-RTO: 0 /100 WBC
PLATELET # BLD AUTO: 243 K/UL (ref 164–446)
PMV BLD AUTO: 10.8 FL (ref 9–12.9)
POTASSIUM SERPL-SCNC: 4.8 MMOL/L (ref 3.6–5.5)
PROT SERPL-MCNC: 7.2 G/DL (ref 6–8.2)
RBC # BLD AUTO: 4.43 M/UL (ref 4.2–5.4)
SODIUM SERPL-SCNC: 138 MMOL/L (ref 135–145)
TRIGL SERPL-MCNC: 182 MG/DL (ref 0–149)
TSH SERPL DL<=0.005 MIU/L-ACNC: 3.92 UIU/ML (ref 0.38–5.33)
VIT B12 SERPL-MCNC: 988 PG/ML (ref 211–911)
WBC # BLD AUTO: 6.7 K/UL (ref 4.8–10.8)

## 2020-10-06 PROCEDURE — 36415 COLL VENOUS BLD VENIPUNCTURE: CPT

## 2020-10-06 PROCEDURE — 82550 ASSAY OF CK (CPK): CPT

## 2020-10-06 PROCEDURE — 80053 COMPREHEN METABOLIC PANEL: CPT

## 2020-10-06 PROCEDURE — 84443 ASSAY THYROID STIM HORMONE: CPT

## 2020-10-06 PROCEDURE — 80061 LIPID PANEL: CPT

## 2020-10-06 PROCEDURE — 82607 VITAMIN B-12: CPT

## 2020-10-06 PROCEDURE — 85025 COMPLETE CBC W/AUTO DIFF WBC: CPT

## 2020-10-06 PROCEDURE — 85652 RBC SED RATE AUTOMATED: CPT

## 2020-10-06 PROCEDURE — 86140 C-REACTIVE PROTEIN: CPT

## 2020-10-07 ENCOUNTER — TELEPHONE (OUTPATIENT)
Dept: MEDICAL GROUP | Facility: MEDICAL CENTER | Age: 45
End: 2020-10-07

## 2020-10-07 NOTE — TELEPHONE ENCOUNTER
----- Message from Sharif Mares M.D. sent at 10/6/2020  2:09 PM PDT -----  Please notify patient that there are no concerns on her blood work except that her triglycerides are borderline high. We advise to reduce sugar/carbohydrate/alcohol, eat more vegetables and lean meats such as fish/chicken/turkey. We also recommend 30 minutes of cardiovascular exercise most days of the week.  She can review the blood work in more detail at her annual appointment with her PCP.  Sharif Mares M.D.

## 2020-10-12 ENCOUNTER — OFFICE VISIT (OUTPATIENT)
Dept: MEDICAL GROUP | Facility: MEDICAL CENTER | Age: 45
End: 2020-10-12
Payer: COMMERCIAL

## 2020-10-12 VITALS
HEIGHT: 65 IN | HEART RATE: 70 BPM | DIASTOLIC BLOOD PRESSURE: 66 MMHG | BODY MASS INDEX: 22.49 KG/M2 | SYSTOLIC BLOOD PRESSURE: 112 MMHG | OXYGEN SATURATION: 97 % | WEIGHT: 135 LBS | TEMPERATURE: 98.7 F

## 2020-10-12 DIAGNOSIS — Z12.31 ENCOUNTER FOR SCREENING MAMMOGRAM FOR BREAST CANCER: ICD-10-CM

## 2020-10-12 DIAGNOSIS — F41.9 ANXIETY: ICD-10-CM

## 2020-10-12 DIAGNOSIS — G47.00 INSOMNIA, UNSPECIFIED TYPE: Chronic | ICD-10-CM

## 2020-10-12 PROCEDURE — 99396 PREV VISIT EST AGE 40-64: CPT | Performed by: INTERNAL MEDICINE

## 2020-10-12 ASSESSMENT — FIBROSIS 4 INDEX: FIB4 SCORE: 0.97

## 2020-10-12 NOTE — PROGRESS NOTES
Subjective:      Jing Ritter is a 44 y.o. 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  These attacks would occur manifested by waking up in the middle of the night with her mind racing and she would notice her heart racing with panic attacks as well, has panic attacks three times per week at night, and during the day will happen three times per week. School started back in session off August 17. Seen urgent care last month and had negative covid test. Has not exercised since last month, as she feels more anxious and her heart would race with exercise, previous to the COVID-19 pandemic she would exercise on the elliptical on a regular basis also with weights.  Given all of these factors she resumed Zoloft 50 mg and has increased the dose to 100 mg a few weeks ago.  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 Sleep 7 hours nightly.   Taking xanax nightly and not on restoril, difficulty with sleep initiation   etoh one per week  No caffeine          Current Outpatient Medications   Medication Sig Dispense Refill   • ALPRAZolam (XANAX) 0.5 MG Tab TAKE ONE TABLET BY MOUTH EVERY NIGHT AT BEDTIME AS NEEDED FOR SLEEP (30 DAYS) 30 Tab 0   • sertraline (ZOLOFT) 100 MG Tab Take 1 Tab by mouth every day. 90 Tab 0   • temazepam (RESTORIL) 15 MG Cap TAKE ONE CAPSULE BY MOUTH EVERY NIGHT AT BEDTIME AS NEEDED FOR SLEEP *30 DAY SUPPLY * RESTORIL* 30 Cap 1   • famciclovir (FAMVIR)  250 MG Tab Take 2 Tabs by mouth 3 times a day as needed (x five days for flare up). 150 Tab 6     No current facility-administered medications for this visit.      Basal cell carcinoma  2/13/17 basal cell forehead     Decreased GFR  8/17/11 bun 15,creat 0.5,GFR 59  10/27/15 bun 15,creat 1.1,GFR>60  11/12/15 bun 17,creat 0.9,GFR 54  11/5/16 bun 19,creat 1.0,GFR>60  9/25/17 bun 17,creat 1.1,GFR 55  11/12/18 bun 15,creat 0.97,GFR>60  10/6/20 bun 13,creat 1.06,GFR,56     Dyslipidemia  8/11 chol 199,trig 162,hdl 60,ldl 107  9/25/17 chol 197,trig 135,hdl 91,ldl 79  11/12/18 chol 229,trig 75,hdl 101,ldl 113  10/6/20 chol 200,trig 182,hdl 91,ldl 73    History depression  11/08 zoloft since    6/11 on zoloft 100 mg   2/14/13 tapering off zoloft down to 50 mg; then off if possible since trying to become pregnant  8/1/14 on zoloft 50 mg  10/5/15 on zoloft 100 mg per gynecology, tried to taper off this summer but not successful  2/13/17 on zoloft 100 mg per gynecology, will try to taper  9/29/17 on zoloft 100 mg having increased from 50 mg 1.5 weeks ago, now just more anxiety and irritability, add xanax 0.25 mg prn   8/26/19 on zoloft 50 mg   9/22/20 restarted zoloft 50 mg five days ago     Insomnia  1/25/13 on ambien, change to restoril 30 mg  2/14/13 taper off restoril since trying to become pregnant  2/19/14 ambien per  gyn  8/1/14 on restoril 15 mg  8/26/19 on restoril 15 mg 2 times per week and xanax once per week       Preventative  2/14/13 tdap  9/14/17 mammogram per gyn   11/12/18 vit d 28  8/26/19 gyn                 Patient Active Problem List   Diagnosis   • History of depression   • Preventative health care   • Insomnia   • Dyslipidemia   • Basal cell carcinoma   • Decreased GFR          Health Maintenance Summary                PAP SMEAR Overdue 11/20/1996     MAMMOGRAM Overdue 9/13/2018      Done 9/13/2017 MA-MAMMO SCREENING BILAT W/IMPLANTS W/STEFANY W/CAD    IMM INFLUENZA Overdue  "9/1/2020      Done 10/29/2019 Imm Admin: Influenza Vaccine Quad Inj (Pf)     Patient has more history with this topic...    IMM DTaP/Tdap/Td Vaccine Next Due 10/29/2029      Done 10/29/2019 Imm Admin: Tdap Vaccine     Patient has more history with this topic...          Patient Care Team:  Azael Nunes M.D. as PCP - General    ROS       Objective:     /66 (BP Location: Right arm, Patient Position: Sitting, BP Cuff Size: Adult)   Pulse 70   Temp 37.1 °C (98.7 °F)   Ht 1.651 m (5' 5\")   Wt 61.2 kg (135 lb)   LMP 10/05/2020 (Approximate)   SpO2 97%   BMI 22.47 kg/m²      Physical Exam  Vitals signs and nursing note reviewed.   Constitutional:       Appearance: Normal appearance.   HENT:      Head: Normocephalic and atraumatic.      Right Ear: External ear normal.      Left Ear: External ear normal.      Nose: No congestion.   Eyes:      Conjunctiva/sclera: Conjunctivae normal.   Neck:      Musculoskeletal: Neck supple.   Cardiovascular:      Rate and Rhythm: Normal rate and regular rhythm.      Heart sounds: Normal heart sounds.   Pulmonary:      Effort: Pulmonary effort is normal.      Breath sounds: Normal breath sounds.   Abdominal:      General: There is no distension.   Musculoskeletal:         General: No swelling.   Skin:     General: Skin is warm.   Neurological:      General: No focal deficit present.      Mental Status: She is alert.   Psychiatric:         Mood and Affect: Mood normal.         Behavior: Behavior normal.                 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  She has occasional panic attacks, as well as insomnia back on Zoloft with some slight improvement, no 100 mg daily    #3 insomnia on Xanax at night not using Restoril concurrently    #4  History of basal cell skin cancer followed by dermatology    #5  Preventative health followed by gynecology    #6 GFR 56 but stable creatinine going back to 2015 1.0-1.1, suspect this is her baseline, no regular NSAIDs      Plan  #!  Continue Zoloft 100 mg daily she just increase the dose 2 weeks ago we will monitor for benefit, if plateau develops, consider increasing dose or adding second medication    #2 offered psychotherapy, she can check with her school district resources or contact us for a referral to the [facility], work on yoga, meditation, regular exercise, relaxation therapy, limit caffeine and alcohol    #3 xanax as needed for insomnia long-term use may cause memory loss but I believe short-term this is fine given the circumstances and increased risk, do not take concurrently with Restoril    #4 follow-up gynecology    #5 mammogram     #6 sees dermatology  use sunscreen when outdoors    #7 notify me if worsening anxiety symptoms    #8 reviewed recent laboratory test from October 6

## 2020-10-28 DIAGNOSIS — G47.00 INSOMNIA, UNSPECIFIED TYPE: Chronic | ICD-10-CM

## 2020-10-28 RX ORDER — TEMAZEPAM 15 MG/1
15 CAPSULE ORAL NIGHTLY PRN
Qty: 30 CAP | Refills: 2 | Status: SHIPPED | OUTPATIENT
Start: 2020-10-28 | End: 2021-10-19 | Stop reason: SDUPTHER

## 2020-10-28 RX ORDER — ALPRAZOLAM 0.5 MG/1
0.5 TABLET ORAL
Qty: 30 TAB | Refills: 0 | Status: SHIPPED | OUTPATIENT
Start: 2020-11-04 | End: 2020-12-29

## 2020-12-21 ENCOUNTER — TELEPHONE (OUTPATIENT)
Dept: MEDICAL GROUP | Facility: MEDICAL CENTER | Age: 45
End: 2020-12-21

## 2020-12-21 DIAGNOSIS — H57.04 DILATED PUPIL: ICD-10-CM

## 2020-12-21 NOTE — TELEPHONE ENCOUNTER
Jing Ritter  [phone]    Pt recently saw you in office re: limb weakness. She is having some dilation in her eye and her eye dr would like her to see Dr Thomas. Please do REF and notify her.

## 2020-12-29 DIAGNOSIS — G47.00 INSOMNIA, UNSPECIFIED TYPE: Chronic | ICD-10-CM

## 2020-12-30 ENCOUNTER — TELEPHONE (OUTPATIENT)
Dept: MEDICAL GROUP | Facility: MEDICAL CENTER | Age: 45
End: 2020-12-30

## 2020-12-30 RX ORDER — SERTRALINE HYDROCHLORIDE 100 MG/1
100 TABLET, FILM COATED ORAL DAILY
Qty: 90 TAB | Refills: 3 | Status: SHIPPED | OUTPATIENT
Start: 2020-12-30 | End: 2021-12-22

## 2020-12-30 NOTE — TELEPHONE ENCOUNTER
If she is referring to the Xanax, this is a controlled substance, we can only give 30 days at a time.

## 2020-12-30 NOTE — TELEPHONE ENCOUNTER
Pt called and wants you to do more than a 30 day supply, Says that she doesn't like having to call in evety month for a RF. Please advise.

## 2021-02-17 PROBLEM — H57.02 ANISOCORIA: Status: ACTIVE | Noted: 2021-02-17

## 2021-03-01 ENCOUNTER — HOSPITAL ENCOUNTER (OUTPATIENT)
Dept: LAB | Facility: MEDICAL CENTER | Age: 46
End: 2021-03-01
Attending: PSYCHIATRY & NEUROLOGY
Payer: COMMERCIAL

## 2021-03-01 LAB
ALBUMIN SERPL BCP-MCNC: 3.9 G/DL (ref 3.2–4.9)
ALBUMIN/GLOB SERPL: 1.1 G/DL
ALP SERPL-CCNC: 39 U/L (ref 30–99)
ALT SERPL-CCNC: 15 U/L (ref 2–50)
ANION GAP SERPL CALC-SCNC: 11 MMOL/L (ref 7–16)
AST SERPL-CCNC: 23 U/L (ref 12–45)
BASOPHILS # BLD AUTO: 1.3 % (ref 0–1.8)
BASOPHILS # BLD: 0.05 K/UL (ref 0–0.12)
BILIRUB SERPL-MCNC: <0.2 MG/DL (ref 0.1–1.5)
BUN SERPL-MCNC: 13 MG/DL (ref 8–22)
CALCIUM SERPL-MCNC: 9.8 MG/DL (ref 8.5–10.5)
CHLORIDE SERPL-SCNC: 101 MMOL/L (ref 96–112)
CO2 SERPL-SCNC: 25 MMOL/L (ref 20–33)
CREAT SERPL-MCNC: 1 MG/DL (ref 0.5–1.4)
CRP SERPL HS-MCNC: 5.98 MG/DL (ref 0–0.75)
EOSINOPHIL # BLD AUTO: 0.2 K/UL (ref 0–0.51)
EOSINOPHIL NFR BLD: 5.1 % (ref 0–6.9)
ERYTHROCYTE [DISTWIDTH] IN BLOOD BY AUTOMATED COUNT: 40.1 FL (ref 35.9–50)
ERYTHROCYTE [SEDIMENTATION RATE] IN BLOOD BY WESTERGREN METHOD: 11 MM/HOUR (ref 0–20)
FASTING STATUS PATIENT QL REPORTED: NORMAL
GLOBULIN SER CALC-MCNC: 3.5 G/DL (ref 1.9–3.5)
GLUCOSE SERPL-MCNC: 89 MG/DL (ref 65–99)
HCT VFR BLD AUTO: 39.8 % (ref 37–47)
HGB BLD-MCNC: 13.2 G/DL (ref 12–16)
IMM GRANULOCYTES # BLD AUTO: 0.02 K/UL (ref 0–0.11)
IMM GRANULOCYTES NFR BLD AUTO: 0.5 % (ref 0–0.9)
LYMPHOCYTES # BLD AUTO: 1.59 K/UL (ref 1–4.8)
LYMPHOCYTES NFR BLD: 40.3 % (ref 22–41)
MCH RBC QN AUTO: 30.5 PG (ref 27–33)
MCHC RBC AUTO-ENTMCNC: 33.2 G/DL (ref 33.6–35)
MCV RBC AUTO: 91.9 FL (ref 81.4–97.8)
MONOCYTES # BLD AUTO: 0.53 K/UL (ref 0–0.85)
MONOCYTES NFR BLD AUTO: 13.4 % (ref 0–13.4)
NEUTROPHILS # BLD AUTO: 1.56 K/UL (ref 2–7.15)
NEUTROPHILS NFR BLD: 39.4 % (ref 44–72)
NRBC # BLD AUTO: 0 K/UL
NRBC BLD-RTO: 0 /100 WBC
PLATELET # BLD AUTO: 203 K/UL (ref 164–446)
PMV BLD AUTO: 11.2 FL (ref 9–12.9)
POTASSIUM SERPL-SCNC: 4.7 MMOL/L (ref 3.6–5.5)
PROT SERPL-MCNC: 7.4 G/DL (ref 6–8.2)
RBC # BLD AUTO: 4.33 M/UL (ref 4.2–5.4)
SODIUM SERPL-SCNC: 137 MMOL/L (ref 135–145)
TREPONEMA PALLIDUM IGG+IGM AB [PRESENCE] IN SERUM OR PLASMA BY IMMUNOASSAY: NORMAL
WBC # BLD AUTO: 4 K/UL (ref 4.8–10.8)

## 2021-03-01 PROCEDURE — 80053 COMPREHEN METABOLIC PANEL: CPT

## 2021-03-01 PROCEDURE — 85025 COMPLETE CBC W/AUTO DIFF WBC: CPT

## 2021-03-01 PROCEDURE — 85652 RBC SED RATE AUTOMATED: CPT

## 2021-03-01 PROCEDURE — 86780 TREPONEMA PALLIDUM: CPT

## 2021-03-01 PROCEDURE — 86140 C-REACTIVE PROTEIN: CPT

## 2021-03-01 PROCEDURE — 36415 COLL VENOUS BLD VENIPUNCTURE: CPT

## 2021-07-14 DIAGNOSIS — G47.00 INSOMNIA, UNSPECIFIED TYPE: Chronic | ICD-10-CM

## 2021-07-14 RX ORDER — ALPRAZOLAM 0.5 MG/1
0.5 TABLET ORAL
Qty: 30 TABLET | Refills: 1 | Status: SHIPPED | OUTPATIENT
Start: 2021-07-15 | End: 2021-10-12

## 2021-10-12 ENCOUNTER — TELEPHONE (OUTPATIENT)
Dept: MEDICAL GROUP | Facility: MEDICAL CENTER | Age: 46
End: 2021-10-12

## 2021-10-12 DIAGNOSIS — G47.00 INSOMNIA, UNSPECIFIED TYPE: Chronic | ICD-10-CM

## 2021-10-12 RX ORDER — ALPRAZOLAM 0.5 MG/1
TABLET ORAL
Qty: 30 TABLET | Refills: 2 | Status: SHIPPED | OUTPATIENT
Start: 2021-10-12 | End: 2021-11-11

## 2021-10-12 NOTE — LETTER
Dear Jing Ritter,    It is hard to believe that we are already half-way through 2021! Our records reflect that you have not seen a primary care provider (Dr. Nunes) yet this year.     It is important to meet with your primary care provider to review your overall health and well-being, to discuss health goals, and to develop a personalized care plan that includes any overdue preventative health measures.      There are two ways to schedule with your primary care provider:     1) [facility] has provided a phone number to schedule a Primary care visit. That number is [phone].      2) You can also schedule online via [employer].        Finally, if you are not yet comfortable with coming back to a provider in person, please remember that telehealth is an option as long as you have a good internet connection and a device that allows video call capabilities, a scale, and a method to take blood pressure!      Schedule your visit today!    Best regards,    Your Healthcare Team

## 2021-10-12 NOTE — TELEPHONE ENCOUNTER
Please notify the patient she is due for a follow-up appointment, we have not seen her in over a year.

## 2021-10-19 DIAGNOSIS — G47.00 INSOMNIA, UNSPECIFIED TYPE: Chronic | ICD-10-CM

## 2021-10-19 RX ORDER — DROSPIRENONE AND ETHINYL ESTRADIOL 0.02-3(28)
KIT ORAL
COMMUNITY
Start: 2021-10-13 | End: 2023-04-30

## 2021-10-19 RX ORDER — TEMAZEPAM 15 MG/1
15 CAPSULE ORAL NIGHTLY PRN
Qty: 30 CAPSULE | Refills: 2 | Status: SHIPPED | OUTPATIENT
Start: 2021-10-19 | End: 2022-01-31

## 2021-12-21 ENCOUNTER — OFFICE VISIT (OUTPATIENT)
Dept: MEDICAL GROUP | Facility: MEDICAL CENTER | Age: 46
End: 2021-12-21
Payer: COMMERCIAL

## 2021-12-21 ENCOUNTER — TELEPHONE (OUTPATIENT)
Dept: MEDICAL GROUP | Facility: MEDICAL CENTER | Age: 46
End: 2021-12-21

## 2021-12-21 VITALS
OXYGEN SATURATION: 99 % | TEMPERATURE: 97.7 F | SYSTOLIC BLOOD PRESSURE: 108 MMHG | BODY MASS INDEX: 22.49 KG/M2 | WEIGHT: 135 LBS | HEIGHT: 65 IN | HEART RATE: 72 BPM | DIASTOLIC BLOOD PRESSURE: 70 MMHG

## 2021-12-21 DIAGNOSIS — F41.9 ANXIETY: ICD-10-CM

## 2021-12-21 DIAGNOSIS — Z12.31 ENCOUNTER FOR SCREENING MAMMOGRAM FOR MALIGNANT NEOPLASM OF BREAST: ICD-10-CM

## 2021-12-21 DIAGNOSIS — Z12.11 COLON CANCER SCREENING: ICD-10-CM

## 2021-12-21 DIAGNOSIS — Z00.00 PREVENTATIVE HEALTH CARE: ICD-10-CM

## 2021-12-21 PROCEDURE — 99396 PREV VISIT EST AGE 40-64: CPT | Performed by: INTERNAL MEDICINE

## 2021-12-21 RX ORDER — ALPRAZOLAM 0.5 MG/1
TABLET ORAL
COMMUNITY
Start: 2021-11-29 | End: 2022-01-31

## 2021-12-21 ASSESSMENT — PATIENT HEALTH QUESTIONNAIRE - PHQ9: CLINICAL INTERPRETATION OF PHQ2 SCORE: 0

## 2021-12-21 ASSESSMENT — FIBROSIS 4 INDEX: FIB4 SCORE: 1.35

## 2021-12-21 NOTE — LETTER
[email or web address]  Azael Nunes M.D.  [address]  [place] [address]  Fax: [phone]   Authorization for Release/Disclosure of   Protected Health Information   Name: BING COLLINS : 1975 SSN: [id number]   Address: [address] [place] [address]  [place] [address] Phone:    [phone] (home)    I authorize the entity listed below to release/disclose the PHI below to:   [email or web address]/Azael Nunes M.D. and Azael Nunes M.D.   Provider or Entity Name:  Dr. Thomas   Address   City, State, Zip   Phone:  [phone]    Fax:  [phone]   Reason for request: continuity of care   Information to be released:    [  ] LAST COLONOSCOPY,  including any PATH REPORT and follow-up  [  ] LAST FIT/COLOGUARD RESULT [  ] LAST DEXA  [  ] LAST MAMMOGRAM  [  ] LAST PAP  [  ] LAST LABS [  ] RETINA EXAM REPORT  [  ] IMMUNIZATION RECORDS  [X  ] Release all info (the last year and MRI/MRA of the head)      [  ] Check here and initial the line next to each item to release ALL health information INCLUDING  _____ Care and treatment for drug and / or alcohol abuse  _____ HIV testing, infection status, or AIDS  _____ Genetic Testing    DATES OF SERVICE OR TIME PERIOD TO BE DISCLOSED: _____________  I understand and acknowledge that:  * This Authorization may be revoked at any time by you in writing, except if your health information has already been used or disclosed.  * Your health information that will be used or disclosed as a result of you signing this authorization could be re-disclosed by the recipient. If this occurs, your re-disclosed health information may no longer be protected by State or Federal laws.  * You may refuse to sign this Authorization. Your refusal will not affect your ability to obtain treatment.  * This Authorization becomes effective upon signing and will  on (date) __________.      If no date is indicated, this Authorization will  one (1) year from the signature date.     Name: Jing Ritter    Signature:Continuity of Care    Date:     12/22/2021       PLEASE FAX REQUESTED RECORDS BACK TO: [phone]

## 2021-12-21 NOTE — LETTER
[facility]  Azael Nunes M.D.  [address]  [facility] [address]  Fax: [phone]   Authorization for Release/Disclosure of   Protected Health Information   Name: BING COLLINS : 1975 SSN: [id number]   Address: [address] [place] [address] Phone:    [phone] (home)    I authorize the entity listed below to release/disclose the PHI below to:   [facility]/Azael Nunes M.D. and Azael Nunes M.D.   Provider or Entity Name:  Dr Moran    Address [address] [facility] [address]  [facility]  Phone:  [phone]    Fax:  [phone]   Reason for request: continuity of care   Information to be released:    [  ] LAST COLONOSCOPY,  including any PATH REPORT and follow-up  [  ] LAST FIT/COLOGUARD RESULT [  ] LAST DEXA  [  ] LAST MAMMOGRAM  [  ] LAST PAP  [  ] LAST LABS [  ] RETINA EXAM REPORT  [  ] IMMUNIZATION RECORDS  [  ] Release all info      [  ] Check here and initial the line next to each item to release ALL health information INCLUDING  _____ Care and treatment for drug and / or alcohol abuse  _____ HIV testing, infection status, or AIDS  _____ Genetic Testing    DATES OF SERVICE OR TIME PERIOD TO BE DISCLOSED: _____________  I understand and acknowledge that:  * This Authorization may be revoked at any time by you in writing, except if your health information has already been used or disclosed.  * Your health information that will be used or disclosed as a result of you signing this authorization could be re-disclosed by the recipient. If this occurs, your re-disclosed health information may no longer be protected by State or Federal laws.  * You may refuse to sign this Authorization. Your refusal will not affect your ability to obtain treatment.  * This Authorization becomes effective upon signing and will  on (date) __________.      If no date is indicated, this Authorization will  one (1) year from the signature  date.    Name: Jing Ritter    Signature:   Date:     12/22/2021       PLEASE FAX REQUESTED RECORDS BACK TO: [phone]

## 2021-12-21 NOTE — TELEPHONE ENCOUNTER
Please obtain old records  (1)  neuroopthalmology, records from this past year as well as results of the MRI/MRA head  (2) old records  gynecology for the past year  (3) old records  dermatology for the past year

## 2021-12-21 NOTE — LETTER
[facility]  Azael Nunes M.D.  [address]  [facility] [address]  Fax: [phone]   Authorization for Release/Disclosure of   Protected Health Information   Name: BING COLLINS : 1975 SSN: [id number]   Address: [address] [facility] [address] Phone:    [phone] (home)    I authorize the entity listed below to release/disclose the PHI below to:   [facility]/Azael Nunes M.D. and Azael Nunes M.D.   Provider or Entity Name:  Dr Hamm    Address [address]  [facility] [id number] [place] [address]  Phone:  [phone]    Fax:  [phone]   Reason for request: continuity of care   Information to be released:    [  ] LAST COLONOSCOPY,  including any PATH REPORT and follow-up  [  ] LAST FIT/COLOGUARD RESULT [  ] LAST DEXA  [  ] LAST MAMMOGRAM  [  ] LAST PAP  [  ] LAST LABS [  ] RETINA EXAM REPORT  [  ] IMMUNIZATION RECORDS  [  ] Release all info      [  ] Check here and initial the line next to each item to release ALL health information INCLUDING  _____ Care and treatment for drug and / or alcohol abuse  _____ HIV testing, infection status, or AIDS  _____ Genetic Testing    DATES OF SERVICE OR TIME PERIOD TO BE DISCLOSED: _____________  I understand and acknowledge that:  * This Authorization may be revoked at any time by you in writing, except if your health information has already been used or disclosed.  * Your health information that will be used or disclosed as a result of you signing this authorization could be re-disclosed by the recipient. If this occurs, your re-disclosed health information may no longer be protected by State or Federal laws.  * You may refuse to sign this Authorization. Your refusal will not affect your ability to obtain treatment.  * This Authorization becomes effective upon signing and will  on (date) __________.      If no date is indicated, this Authorization will  one (1) year from the  signature date.    Name: Jing Kady Riya    Signature:   Date:     12/22/2021       PLEASE FAX REQUESTED RECORDS BACK TO: [phone]

## 2022-01-06 ENCOUNTER — TELEPHONE (OUTPATIENT)
Dept: MEDICAL GROUP | Facility: MEDICAL CENTER | Age: 47
End: 2022-01-06

## 2022-01-06 RX ORDER — FAMCICLOVIR 250 MG/1
TABLET ORAL
Qty: 90 TABLET | Refills: 11 | Status: SHIPPED | OUTPATIENT
Start: 2022-01-06 | End: 2022-01-13 | Stop reason: SDUPTHER

## 2022-01-07 NOTE — TELEPHONE ENCOUNTER
Need PAR  (1) famciclovir 250 mg take two po tid x 5 days prn flare up  (2) #90 per 30 days  (3) diagnosis: recurrent HSV flare up  (4) ICD 10-B00.9  (5) has tried acyclovir

## 2022-01-13 RX ORDER — FAMCICLOVIR 250 MG/1
TABLET ORAL
Qty: 60 TABLET | Refills: 11 | Status: SHIPPED | OUTPATIENT
Start: 2022-01-13

## 2022-01-26 ENCOUNTER — HOSPITAL ENCOUNTER (OUTPATIENT)
Dept: LAB | Facility: MEDICAL CENTER | Age: 47
End: 2022-01-26
Attending: INTERNAL MEDICINE
Payer: COMMERCIAL

## 2022-01-26 ENCOUNTER — HOSPITAL ENCOUNTER (OUTPATIENT)
Dept: LAB | Facility: MEDICAL CENTER | Age: 47
End: 2022-01-26
Attending: PSYCHIATRY & NEUROLOGY
Payer: COMMERCIAL

## 2022-01-26 DIAGNOSIS — Z00.00 PREVENTATIVE HEALTH CARE: ICD-10-CM

## 2022-01-26 LAB
25(OH)D3 SERPL-MCNC: 60 NG/ML (ref 30–100)
ALBUMIN SERPL BCP-MCNC: 4.5 G/DL (ref 3.2–4.9)
ALBUMIN/GLOB SERPL: 1.5 G/DL
ALP SERPL-CCNC: 39 U/L (ref 30–99)
ALT SERPL-CCNC: 14 U/L (ref 2–50)
ANION GAP SERPL CALC-SCNC: 12 MMOL/L (ref 7–16)
AST SERPL-CCNC: 17 U/L (ref 12–45)
BASOPHILS # BLD AUTO: 0.9 % (ref 0–1.8)
BASOPHILS # BLD: 0.05 K/UL (ref 0–0.12)
BILIRUB SERPL-MCNC: 0.3 MG/DL (ref 0.1–1.5)
BUN SERPL-MCNC: 13 MG/DL (ref 8–22)
CALCIUM SERPL-MCNC: 9.5 MG/DL (ref 8.5–10.5)
CHLORIDE SERPL-SCNC: 105 MMOL/L (ref 96–112)
CHOLEST SERPL-MCNC: 247 MG/DL (ref 100–199)
CO2 SERPL-SCNC: 23 MMOL/L (ref 20–33)
CREAT SERPL-MCNC: 1.06 MG/DL (ref 0.5–1.4)
EOSINOPHIL # BLD AUTO: 0.09 K/UL (ref 0–0.51)
EOSINOPHIL NFR BLD: 1.6 % (ref 0–6.9)
ERYTHROCYTE [DISTWIDTH] IN BLOOD BY AUTOMATED COUNT: 40.2 FL (ref 35.9–50)
EST. AVERAGE GLUCOSE BLD GHB EST-MCNC: 103 MG/DL
FASTING STATUS PATIENT QL REPORTED: NORMAL
GLOBULIN SER CALC-MCNC: 3 G/DL (ref 1.9–3.5)
GLUCOSE SERPL-MCNC: 82 MG/DL (ref 65–99)
HBA1C MFR BLD: 5.2 % (ref 4–5.6)
HCT VFR BLD AUTO: 39.5 % (ref 37–47)
HDLC SERPL-MCNC: 101 MG/DL
HGB BLD-MCNC: 13.2 G/DL (ref 12–16)
IMM GRANULOCYTES # BLD AUTO: 0.01 K/UL (ref 0–0.11)
IMM GRANULOCYTES NFR BLD AUTO: 0.2 % (ref 0–0.9)
LDLC SERPL CALC-MCNC: 122 MG/DL
LYMPHOCYTES # BLD AUTO: 2.3 K/UL (ref 1–4.8)
LYMPHOCYTES NFR BLD: 39.9 % (ref 22–41)
MCH RBC QN AUTO: 30.4 PG (ref 27–33)
MCHC RBC AUTO-ENTMCNC: 33.4 G/DL (ref 33.6–35)
MCV RBC AUTO: 91 FL (ref 81.4–97.8)
MONOCYTES # BLD AUTO: 0.38 K/UL (ref 0–0.85)
MONOCYTES NFR BLD AUTO: 6.6 % (ref 0–13.4)
NEUTROPHILS # BLD AUTO: 2.93 K/UL (ref 2–7.15)
NEUTROPHILS NFR BLD: 50.8 % (ref 44–72)
NRBC # BLD AUTO: 0 K/UL
NRBC BLD-RTO: 0 /100 WBC
PLATELET # BLD AUTO: 245 K/UL (ref 164–446)
PMV BLD AUTO: 10.6 FL (ref 9–12.9)
POTASSIUM SERPL-SCNC: 4.4 MMOL/L (ref 3.6–5.5)
PROT SERPL-MCNC: 7.5 G/DL (ref 6–8.2)
RBC # BLD AUTO: 4.34 M/UL (ref 4.2–5.4)
SARS-COV-2 AB SERPL QL IA: REACTIVE
SODIUM SERPL-SCNC: 140 MMOL/L (ref 135–145)
TRIGL SERPL-MCNC: 120 MG/DL (ref 0–149)
TSH SERPL DL<=0.005 MIU/L-ACNC: 3.01 UIU/ML (ref 0.38–5.33)
WBC # BLD AUTO: 5.8 K/UL (ref 4.8–10.8)

## 2022-01-26 PROCEDURE — 80061 LIPID PANEL: CPT

## 2022-01-26 PROCEDURE — 80053 COMPREHEN METABOLIC PANEL: CPT

## 2022-01-26 PROCEDURE — 82306 VITAMIN D 25 HYDROXY: CPT

## 2022-01-26 PROCEDURE — 85025 COMPLETE CBC W/AUTO DIFF WBC: CPT

## 2022-01-26 PROCEDURE — 83036 HEMOGLOBIN GLYCOSYLATED A1C: CPT

## 2022-01-26 PROCEDURE — 36415 COLL VENOUS BLD VENIPUNCTURE: CPT

## 2022-01-26 PROCEDURE — 86769 SARS-COV-2 COVID-19 ANTIBODY: CPT

## 2022-01-26 PROCEDURE — 84443 ASSAY THYROID STIM HORMONE: CPT

## 2022-01-27 ENCOUNTER — TELEPHONE (OUTPATIENT)
Dept: MEDICAL GROUP | Facility: MEDICAL CENTER | Age: 47
End: 2022-01-27

## 2022-01-28 NOTE — TELEPHONE ENCOUNTER
Notified with labs, no changes, vitamin D normal, GFR 56 but normal creatinine which has not changed going back over 6 years and this is not concerning, she will continue her good nutrition and exercise program. We receive an initial denial of the Famvir and her insurance notified us that they do not cover quantity 90 at a time, so we changed that to a quantity 60 and that was faxed to her pharmacy at [facility] on the 13th. Her pharmacy should have that update along with the insurance note.

## 2022-03-18 DIAGNOSIS — G47.00 INSOMNIA, UNSPECIFIED TYPE: Chronic | ICD-10-CM

## 2022-03-18 DIAGNOSIS — F41.9 ANXIETY: ICD-10-CM

## 2022-03-18 RX ORDER — ALPRAZOLAM 0.5 MG/1
0.5 TABLET ORAL NIGHTLY PRN
Qty: 30 TABLET | Refills: 1 | Status: SHIPPED | OUTPATIENT
Start: 2022-03-18 | End: 2022-06-26

## 2022-04-13 PROBLEM — K59.00 CONSTIPATION: Status: ACTIVE | Noted: 2022-04-13

## 2022-04-27 ENCOUNTER — HOSPITAL ENCOUNTER (OUTPATIENT)
Dept: RADIOLOGY | Facility: MEDICAL CENTER | Age: 47
End: 2022-04-27
Attending: INTERNAL MEDICINE
Payer: COMMERCIAL

## 2022-04-27 DIAGNOSIS — Z12.31 ENCOUNTER FOR SCREENING MAMMOGRAM FOR MALIGNANT NEOPLASM OF BREAST: ICD-10-CM

## 2022-04-27 PROCEDURE — 77063 BREAST TOMOSYNTHESIS BI: CPT

## 2022-04-28 ENCOUNTER — TELEPHONE (OUTPATIENT)
Dept: MEDICAL GROUP | Facility: MEDICAL CENTER | Age: 47
End: 2022-04-28
Payer: COMMERCIAL

## 2022-04-29 NOTE — TELEPHONE ENCOUNTER
Results noted, imaging will notify patient of results and recommendation to repeat test in one year.

## 2022-06-24 DIAGNOSIS — G47.00 INSOMNIA, UNSPECIFIED TYPE: Chronic | ICD-10-CM

## 2022-06-25 DIAGNOSIS — G47.00 INSOMNIA, UNSPECIFIED TYPE: Chronic | ICD-10-CM

## 2022-06-26 RX ORDER — ALPRAZOLAM 0.5 MG/1
0.5 TABLET ORAL NIGHTLY PRN
Qty: 30 TABLET | Refills: 1 | Status: SHIPPED | OUTPATIENT
Start: 2022-06-26 | End: 2022-07-26

## 2022-07-27 ENCOUNTER — APPOINTMENT (RX ONLY)
Dept: URBAN - METROPOLITAN AREA CLINIC 35 | Facility: CLINIC | Age: 47
Setting detail: DERMATOLOGY
End: 2022-07-27

## 2022-07-27 DIAGNOSIS — Z71.89 OTHER SPECIFIED COUNSELING: ICD-10-CM

## 2022-07-27 DIAGNOSIS — L60.0 INGROWING NAIL: ICD-10-CM

## 2022-07-27 DIAGNOSIS — L81.4 OTHER MELANIN HYPERPIGMENTATION: ICD-10-CM

## 2022-07-27 DIAGNOSIS — D22 MELANOCYTIC NEVI: ICD-10-CM

## 2022-07-27 DIAGNOSIS — L82.1 OTHER SEBORRHEIC KERATOSIS: ICD-10-CM

## 2022-07-27 DIAGNOSIS — Z85.828 PERSONAL HISTORY OF OTHER MALIGNANT NEOPLASM OF SKIN: ICD-10-CM

## 2022-07-27 PROBLEM — D22.5 MELANOCYTIC NEVI OF TRUNK: Status: ACTIVE | Noted: 2022-07-27

## 2022-07-27 PROCEDURE — 99213 OFFICE O/P EST LOW 20 MIN: CPT

## 2022-07-27 PROCEDURE — ? ADDITIONAL NOTES

## 2022-07-27 PROCEDURE — ? COUNSELING

## 2022-07-27 ASSESSMENT — LOCATION DETAILED DESCRIPTION DERM
LOCATION DETAILED: RIGHT SUPERIOR UPPER BACK
LOCATION DETAILED: LEFT LATERAL GREAT TOE
LOCATION DETAILED: RIGHT CENTRAL FRONTAL SCALP
LOCATION DETAILED: RIGHT MID-UPPER BACK
LOCATION DETAILED: RIGHT INFERIOR UPPER BACK

## 2022-07-27 ASSESSMENT — LOCATION ZONE DERM
LOCATION ZONE: TOE
LOCATION ZONE: TRUNK
LOCATION ZONE: SCALP

## 2022-07-27 ASSESSMENT — LOCATION SIMPLE DESCRIPTION DERM
LOCATION SIMPLE: LEFT GREAT TOE
LOCATION SIMPLE: RIGHT SCALP
LOCATION SIMPLE: RIGHT UPPER BACK

## 2022-07-27 NOTE — PROCEDURE: ADDITIONAL NOTES
Additional Notes: She will see a podiatrist as she has an area which gets repeatedly ingrown.
Detail Level: Simple
Render Risk Assessment In Note?: no

## 2022-08-03 ENCOUNTER — HOSPITAL ENCOUNTER (OUTPATIENT)
Dept: LAB | Facility: MEDICAL CENTER | Age: 47
End: 2022-08-03
Attending: INTERNAL MEDICINE
Payer: COMMERCIAL

## 2022-08-03 LAB
MAGNESIUM SERPL-MCNC: 2.2 MG/DL (ref 1.5–2.5)
PHOSPHATE SERPL-MCNC: 3.5 MG/DL (ref 2.5–4.5)
T3FREE SERPL-MCNC: 2.33 PG/ML (ref 2–4.4)
T4 SERPL-MCNC: 8.5 UG/DL (ref 4–12)
TSH SERPL DL<=0.005 MIU/L-ACNC: 3.41 UIU/ML (ref 0.38–5.33)

## 2022-08-03 PROCEDURE — 83735 ASSAY OF MAGNESIUM: CPT

## 2022-08-03 PROCEDURE — 84436 ASSAY OF TOTAL THYROXINE: CPT

## 2022-08-03 PROCEDURE — 84481 FREE ASSAY (FT-3): CPT

## 2022-08-03 PROCEDURE — 36415 COLL VENOUS BLD VENIPUNCTURE: CPT

## 2022-08-03 PROCEDURE — 84100 ASSAY OF PHOSPHORUS: CPT

## 2022-08-03 PROCEDURE — 84443 ASSAY THYROID STIM HORMONE: CPT

## 2022-11-16 DIAGNOSIS — F41.9 ANXIETY: ICD-10-CM

## 2022-12-11 SDOH — HEALTH STABILITY: PHYSICAL HEALTH: ON AVERAGE, HOW MANY DAYS PER WEEK DO YOU ENGAGE IN MODERATE TO STRENUOUS EXERCISE (LIKE A BRISK WALK)?: 6 DAYS

## 2022-12-11 SDOH — ECONOMIC STABILITY: FOOD INSECURITY: WITHIN THE PAST 12 MONTHS, THE FOOD YOU BOUGHT JUST DIDN'T LAST AND YOU DIDN'T HAVE MONEY TO GET MORE.: NEVER TRUE

## 2022-12-11 SDOH — ECONOMIC STABILITY: HOUSING INSECURITY

## 2022-12-11 SDOH — ECONOMIC STABILITY: FOOD INSECURITY: WITHIN THE PAST 12 MONTHS, YOU WORRIED THAT YOUR FOOD WOULD RUN OUT BEFORE YOU GOT MONEY TO BUY MORE.: NEVER TRUE

## 2022-12-11 SDOH — ECONOMIC STABILITY: INCOME INSECURITY: IN THE LAST 12 MONTHS, WAS THERE A TIME WHEN YOU WERE NOT ABLE TO PAY THE MORTGAGE OR RENT ON TIME?: NO

## 2022-12-11 SDOH — ECONOMIC STABILITY: TRANSPORTATION INSECURITY
IN THE PAST 12 MONTHS, HAS THE LACK OF TRANSPORTATION KEPT YOU FROM MEDICAL APPOINTMENTS OR FROM GETTING MEDICATIONS?: NO

## 2022-12-11 SDOH — ECONOMIC STABILITY: TRANSPORTATION INSECURITY
IN THE PAST 12 MONTHS, HAS LACK OF TRANSPORTATION KEPT YOU FROM MEETINGS, WORK, OR FROM GETTING THINGS NEEDED FOR DAILY LIVING?: NO

## 2022-12-11 SDOH — ECONOMIC STABILITY: HOUSING INSECURITY: IN THE LAST 12 MONTHS, HOW MANY PLACES HAVE YOU LIVED?: 1

## 2022-12-11 SDOH — HEALTH STABILITY: PHYSICAL HEALTH: ON AVERAGE, HOW MANY MINUTES DO YOU ENGAGE IN EXERCISE AT THIS LEVEL?: 60 MIN

## 2022-12-11 SDOH — ECONOMIC STABILITY: INCOME INSECURITY: HOW HARD IS IT FOR YOU TO PAY FOR THE VERY BASICS LIKE FOOD, HOUSING, MEDICAL CARE, AND HEATING?: NOT HARD AT ALL

## 2022-12-11 SDOH — ECONOMIC STABILITY: TRANSPORTATION INSECURITY
IN THE PAST 12 MONTHS, HAS LACK OF RELIABLE TRANSPORTATION KEPT YOU FROM MEDICAL APPOINTMENTS, MEETINGS, WORK OR FROM GETTING THINGS NEEDED FOR DAILY LIVING?: NO

## 2022-12-11 SDOH — ECONOMIC STABILITY: HOUSING INSECURITY
IN THE LAST 12 MONTHS, WAS THERE A TIME WHEN YOU DID NOT HAVE A STEADY PLACE TO SLEEP OR SLEPT IN A SHELTER (INCLUDING NOW)?: NO

## 2022-12-11 SDOH — HEALTH STABILITY: MENTAL HEALTH
STRESS IS WHEN SOMEONE FEELS TENSE, NERVOUS, ANXIOUS, OR CAN'T SLEEP AT NIGHT BECAUSE THEIR MIND IS TROUBLED. HOW STRESSED ARE YOU?: ONLY A LITTLE

## 2022-12-11 ASSESSMENT — SOCIAL DETERMINANTS OF HEALTH (SDOH)
HOW OFTEN DO YOU ATTENT MEETINGS OF THE CLUB OR ORGANIZATION YOU BELONG TO?: MORE THAN 4 TIMES PER YEAR
HOW MANY DRINKS CONTAINING ALCOHOL DO YOU HAVE ON A TYPICAL DAY WHEN YOU ARE DRINKING: PATIENT DOES NOT DRINK
HOW OFTEN DO YOU ATTEND CHURCH OR RELIGIOUS SERVICES?: MORE THAN 4 TIMES PER YEAR
IN A TYPICAL WEEK, HOW MANY TIMES DO YOU TALK ON THE PHONE WITH FAMILY, FRIENDS, OR NEIGHBORS?: MORE THAN THREE TIMES A WEEK
HOW HARD IS IT FOR YOU TO PAY FOR THE VERY BASICS LIKE FOOD, HOUSING, MEDICAL CARE, AND HEATING?: NOT HARD AT ALL
HOW OFTEN DO YOU HAVE A DRINK CONTAINING ALCOHOL: NEVER
DO YOU BELONG TO ANY CLUBS OR ORGANIZATIONS SUCH AS CHURCH GROUPS UNIONS, FRATERNAL OR ATHLETIC GROUPS, OR SCHOOL GROUPS?: YES
HOW OFTEN DO YOU GET TOGETHER WITH FRIENDS OR RELATIVES?: MORE THAN THREE TIMES A WEEK
HOW OFTEN DO YOU ATTENT MEETINGS OF THE CLUB OR ORGANIZATION YOU BELONG TO?: MORE THAN 4 TIMES PER YEAR
HOW OFTEN DO YOU GET TOGETHER WITH FRIENDS OR RELATIVES?: MORE THAN THREE TIMES A WEEK
WITHIN THE PAST 12 MONTHS, YOU WORRIED THAT YOUR FOOD WOULD RUN OUT BEFORE YOU GOT THE MONEY TO BUY MORE: NEVER TRUE
DO YOU BELONG TO ANY CLUBS OR ORGANIZATIONS SUCH AS CHURCH GROUPS UNIONS, FRATERNAL OR ATHLETIC GROUPS, OR SCHOOL GROUPS?: YES
HOW OFTEN DO YOU ATTEND CHURCH OR RELIGIOUS SERVICES?: MORE THAN 4 TIMES PER YEAR
IN A TYPICAL WEEK, HOW MANY TIMES DO YOU TALK ON THE PHONE WITH FAMILY, FRIENDS, OR NEIGHBORS?: MORE THAN THREE TIMES A WEEK
HOW OFTEN DO YOU HAVE SIX OR MORE DRINKS ON ONE OCCASION: NEVER

## 2022-12-11 ASSESSMENT — LIFESTYLE VARIABLES
HOW MANY STANDARD DRINKS CONTAINING ALCOHOL DO YOU HAVE ON A TYPICAL DAY: PATIENT DOES NOT DRINK
HOW OFTEN DO YOU HAVE A DRINK CONTAINING ALCOHOL: NEVER
AUDIT-C TOTAL SCORE: 0
HOW OFTEN DO YOU HAVE SIX OR MORE DRINKS ON ONE OCCASION: NEVER
SKIP TO QUESTIONS 9-10: 1

## 2022-12-12 ENCOUNTER — TELEPHONE (OUTPATIENT)
Dept: MEDICAL GROUP | Facility: MEDICAL CENTER | Age: 47
End: 2022-12-12

## 2022-12-12 ENCOUNTER — OFFICE VISIT (OUTPATIENT)
Dept: MEDICAL GROUP | Facility: MEDICAL CENTER | Age: 47
End: 2022-12-12
Payer: COMMERCIAL

## 2022-12-12 VITALS
DIASTOLIC BLOOD PRESSURE: 58 MMHG | OXYGEN SATURATION: 99 % | TEMPERATURE: 98.5 F | HEIGHT: 65 IN | SYSTOLIC BLOOD PRESSURE: 96 MMHG | BODY MASS INDEX: 23.32 KG/M2 | HEART RATE: 66 BPM | WEIGHT: 140 LBS

## 2022-12-12 DIAGNOSIS — Z78.9 USES BIRTH CONTROL: ICD-10-CM

## 2022-12-12 DIAGNOSIS — Z00.00 PREVENTATIVE HEALTH CARE: Chronic | ICD-10-CM

## 2022-12-12 DIAGNOSIS — R94.4 DECREASED GFR: ICD-10-CM

## 2022-12-12 DIAGNOSIS — Z11.59 NEED FOR HEPATITIS C SCREENING TEST: ICD-10-CM

## 2022-12-12 DIAGNOSIS — E55.9 VITAMIN D DEFICIENCY: ICD-10-CM

## 2022-12-12 DIAGNOSIS — Z83.49 FAMILY HISTORY OF HEMOCHROMATOSIS: ICD-10-CM

## 2022-12-12 DIAGNOSIS — G47.00 INSOMNIA, UNSPECIFIED TYPE: Chronic | ICD-10-CM

## 2022-12-12 PROCEDURE — 99396 PREV VISIT EST AGE 40-64: CPT | Performed by: INTERNAL MEDICINE

## 2022-12-12 ASSESSMENT — ACTIVITIES OF DAILY LIVING (ADL): BATHING_REQUIRES_ASSISTANCE: 0

## 2022-12-12 ASSESSMENT — PATIENT HEALTH QUESTIONNAIRE - PHQ9: CLINICAL INTERPRETATION OF PHQ2 SCORE: 0

## 2022-12-12 ASSESSMENT — FIBROSIS 4 INDEX: FIB4 SCORE: 0.87

## 2022-12-12 ASSESSMENT — ENCOUNTER SYMPTOMS: GENERAL WELL-BEING: POOR

## 2022-12-12 NOTE — LETTER
[facility]  Azael Nunes M.D.  [address]  [place] [address]  Fax: [phone]   Authorization for Release/Disclosure of   Protected Health Information   Name: BING COLLINS : 1975 SSN: [id number]   Address: [address] [place] [address]  [place] [address] Phone:    [phone] (home)    I authorize the entity listed below to release/disclose the PHI below to:   [facility]/Azael Nunes M.D. and Azael Nunes M.D.   Provider or Entity Name:                                                            [facility]   Address   City, [facility]   Phone:      Fax:                [phone]   Reason for request: continuity of care   Information to be released: OLD RECORDS   [  ] LAST COLONOSCOPY,  including any PATH REPORT and follow-up  [  ] LAST FIT/COLOGUARD RESULT [  ] LAST DEXA  [  ] LAST MAMMOGRAM  [  ] LAST PAP  [  ] LAST LABS [  ] RETINA EXAM REPORT  [  ] IMMUNIZATION RECORDS  [ x ] Release all info      [  ] Check here and initial the line next to each item to release ALL health information INCLUDING  _____ Care and treatment for drug and / or alcohol abuse  _____ HIV testing, infection status, or AIDS  _____ Genetic Testing    DATES OF SERVICE OR TIME PERIOD TO BE DISCLOSED: _____________  I understand and acknowledge that:  * This Authorization may be revoked at any time by you in writing, except if your health information has already been used or disclosed.  * Your health information that will be used or disclosed as a result of you signing this authorization could be re-disclosed by the recipient. If this occurs, your re-disclosed health information may no longer be protected by State or Federal laws.  * You may refuse to sign this Authorization. Your refusal will not affect your ability to obtain treatment.  * This Authorization becomes effective upon signing and will  on (date) __________.      If no date is indicated, this Authorization will  one (1) year  from the signature date.    Name: Jing Ritter    Signature:                          Continuity of Care   Date:     12/16/2022       PLEASE FAX REQUESTED RECORDS BACK TO: [phone]

## 2022-12-13 NOTE — PROGRESS NOTES
Subjective     Jing Ritter is a 47 y.o. female who presents with Annual Exam            HPI    Here for annual exam   Medications, allergies, medical history, surgical history, social history, family history  reviewed and updated  Sees gynecology  currently on jessica for birth control continuous.  Pap smear per gynecology.  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  She will normally exercise six days per week, walks 2-3 miles per day and does gym 4 times per week for weight training, 45 minutes, and additionally does [place] 3 days per week which includes yoga.  She tries to follow a healthy diet, avoids processed foods. Does drink water, no soda, no coffee, does drink tea. 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  Does still take the zoloft 50 mg uses the xanax as needed for sleep does not take the Xanax every night and at times will cut the medication in half, the medication does not cause any drowsiness or hangover effect the next morning.  Anxiety stable on Zoloft 50 mg daily.  She does have decreased libido, tries to optimize her sleep, nutrition, no alcohol.  No depression.  Good relationship with her  and family.  Family history of hemochromatosis in her father, patient herself has not been diagnosed with hemochromatosis but did take a 23 and me test that apparently did show that she might have the genetics associate with hemochromatosis.        Medications, allergies, medical history, surgical history, social history, family history  reviewed and updated  Current Outpatient Medications   Medication Sig Dispense Refill    ALPRAZolam (XANAX) 0.5 MG Tab TAKE ONE TABLET BY MOUTH EVERY NIGHT AT BEDTIME AS NEEDED FOR SLEEP FOR UP TO  30 DAYS 30 Tablet 1    famciclovir (FAMVIR) 250 MG Tab Take 2 tab tid x 5 days as needed for flare up 60 Tablet 11    sertraline (ZOLOFT) 100 MG Tab TAKE ONE TABLET BY MOUTH DAILY 90 Tablet 3    SUZY 3-0.02 MG per tablet        No current facility-administered medications for this visit.       anisocoria  2/1/21  letter ophthalmology note likely, ordered MRI brain with and without, MRA head, labs including cbc,cmp,crp,esr,rpr  3/15/21  neuro-ophthalmology note, mild anisocoria, no evidence of jessica syndrome, repeat labs ordered, follow-up 7 months     anxiety  11/08 zoloft since    6/11 on zoloft 100 mg   2/14/13 tapering off zoloft down to 50 mg; then off if possible since trying to become pregnant  8/1/14 on zoloft 50 mg  10/5/15 on zoloft 100 mg per gynecology, tried to taper off this summer but not successful  2/13/17 on zoloft 100 mg per gynecology, will try to taper  9/29/17 on zoloft 100 mg having increased from 50 mg 1.5 weeks ago, now just more anxiety and irritability, add xanax 0.25 mg prn   8/26/19 on zoloft 50 mg   9/22/20 restarted zoloft 50 mg five days ago  10/12/20 on zoloft 100 mg for the past 2 weeks, will continue, offered behavioral health  12/21/21 on zoloft 100 mg alternating with 50 mg, work on taper  3/18/22 refill xanax, uses restoril less frequently  8/8/22 xanax 0.5 mg refill  9/14/22 xanax 0.5 mg refill  10/12/22 xanax 0.5 mg refill  11/16/22 on zoloft 50 mg taper, xanax 0.5 mg refill    constipation  4/13/22 [facility] note likely slow transit constipation, labs ordered magnesium, phosphorous, thyroid panel, lactulose hydrogen methane breath test, will schedule for screening colonoscopy  8/24/22 colon per [facility] melanosis, consistent with chronic constipation, medium grade 2 internal hemorrhoids, repeat 10 years     Decreased GFR  8/17/11 bun 15,creat 0.5,GFR 59  10/27/15 bun 15,creat 1.1,GFR>60  11/12/15 bun 17,creat 0.9,GFR 54  11/5/16 bun 19,creat 1.0,GFR>60  9/25/17  "bun 17,creat 1.1,GFR 55  11/12/18 bun 15,creat 0.97,GFR>60  10/6/20 bun 13,creat 1.06,GFR,56  1/26/22 bun 13,creat 1.0,GFR 56     Dyslipidemia  8/11 chol 199,trig 162,hdl 60,ldl 107  9/25/17 chol 197,trig 135,hdl 91,ldl 79  11/12/18 chol 229,trig 75,hdl 101,ldl 113  10/6/20 chol 200,trig 182,hdl 91,ldl 73  1/26/22 chol 247,trig 120,hdl 101,ldl 122     history of basal cell carcinoma  2/13/17 basal cell forehead     Insomnia  1/25/13 on ambien, change to restoril 30 mg  2/14/13 taper off restoril since trying to become pregnant  2/19/14 ambien per  gyn  8/1/14 on restoril 15 mg  8/26/19 on restoril 15 mg 2 times per week and xanax once per week   10/12/20 on xanax nightly during covid stress, not using restoril as frequently  3/18/22 refill xanax, uses restoril less frequently  5/8/22 xanax 0.5 mg #30 one refill  5/8/22 restoril 15 mg #30 two refills  6/26/22 refill xanax one refill  8/8/22 refill xanax  9/12/22 refill xanax      Preventative  8/26/19 gyn   10/29/19 tdap  3/11/21 pap per  gyn   11/18/21 covid pfizer booster  1/26/22 vit d 60  1/26/22 A1c 5.2%  4/28/22 mammogram   8/24/22 colon per [facility] melanosis, consistent with chronic constipation, medium grade 2 internal hemorrhoids, repeat 10 years     Patient Active Problem List   Diagnosis    Anxiety    Preventative health care    Insomnia    Dyslipidemia    History of basal cell carcinoma    Decreased GFR    Anisocoria    Constipation       Depression Screening  Little interest or pleasure in doing things?  0 - not at all  Feeling down, depressed , or hopeless? 0 - not at all  Patient Health Questionnaire Score: 0              Patient Care Team:  Azael Nunes M.D. as PCP - General          ROS           Objective     BP (!) 96/58 (BP Location: Right arm, Patient Position: Sitting, BP Cuff Size: Adult)   Pulse 66   Temp 36.9 °C (98.5 °F)   Ht 1.651 m (5' 5\")   Wt 63.5 kg (140 lb)   SpO2 99%   BMI 23.30 kg/m²      Physical " Exam  Vitals and nursing note reviewed.   Constitutional:       Appearance: Normal appearance.   HENT:      Head: Normocephalic and atraumatic.      Right Ear: External ear normal.      Left Ear: External ear normal.   Eyes:      Conjunctiva/sclera: Conjunctivae normal.   Cardiovascular:      Rate and Rhythm: Normal rate.      Heart sounds: Normal heart sounds.   Pulmonary:      Effort: Pulmonary effort is normal.      Breath sounds: Normal breath sounds.   Abdominal:      General: There is no distension.   Musculoskeletal:         General: No swelling.   Neurological:      General: No focal deficit present.      Mental Status: She is alert.   Psychiatric:         Mood and Affect: Mood normal.         Behavior: Behavior normal.                           Assessment & Plan      Assessment  #1 annual exam    #2 anxiety stable on Zoloft 50 mg daily    #3 insomnia on Xanax 0.5 mg half a tablet per night as needed does not take this every night    #4 birth control per gynecology    #5 family history of hemochromatosis in father, patient states she did test positive for genetics in the 23 and me test, we do not have those records    #6 history of basal cell followed by dermatology             Plan  #!  Patient had labs done through her work, included labs lipid panel, vitamin D, CBC, CMP    #2 old records  dermatology continue sunscreen    #3 old records  gynecology    #4 continue Zoloft 50 mg daily    #5 continue good sleep hygiene, limiting caffeine late in the day, continue no alcohol, continue good nutrition and exercise program, long-term Xanax may contribute to habituation, dependence, memory loss    #6 repeat labs ordered, she will upload a copy of the labs she had done for work, I will order additional labs including vitamin D, A1c, we will order a iron panel and hemochromatosis testing given her family history and the fact that she may have tested positive on the 23 and me test    #7 she can get  the influenza vaccine here at any time and COVID-vaccine at the pharmacy    #8 follow-up 1 year    #9 I did find the colonoscopy result, she will repeat that in 10 years per GI

## 2023-01-04 ENCOUNTER — HOSPITAL ENCOUNTER (OUTPATIENT)
Dept: LAB | Facility: MEDICAL CENTER | Age: 48
End: 2023-01-04
Attending: INTERNAL MEDICINE
Payer: COMMERCIAL

## 2023-01-04 DIAGNOSIS — R94.4 DECREASED GFR: ICD-10-CM

## 2023-01-04 DIAGNOSIS — Z83.49 FAMILY HISTORY OF HEMOCHROMATOSIS: ICD-10-CM

## 2023-01-04 DIAGNOSIS — Z00.00 PREVENTATIVE HEALTH CARE: Chronic | ICD-10-CM

## 2023-01-04 DIAGNOSIS — Z11.59 NEED FOR HEPATITIS C SCREENING TEST: ICD-10-CM

## 2023-01-04 LAB
ALBUMIN SERPL BCP-MCNC: 4.2 G/DL (ref 3.2–4.9)
ALBUMIN/GLOB SERPL: 1.6 G/DL
ALP SERPL-CCNC: 38 U/L (ref 30–99)
ALT SERPL-CCNC: 13 U/L (ref 2–50)
ANION GAP SERPL CALC-SCNC: 10 MMOL/L (ref 7–16)
APPEARANCE UR: CLEAR
AST SERPL-CCNC: 23 U/L (ref 12–45)
BASOPHILS # BLD AUTO: 0.5 % (ref 0–1.8)
BASOPHILS # BLD: 0.04 K/UL (ref 0–0.12)
BILIRUB SERPL-MCNC: <0.2 MG/DL (ref 0.1–1.5)
BILIRUB UR QL STRIP.AUTO: NEGATIVE
BUN SERPL-MCNC: 16 MG/DL (ref 8–22)
CALCIUM ALBUM COR SERPL-MCNC: 9.4 MG/DL (ref 8.5–10.5)
CALCIUM SERPL-MCNC: 9.6 MG/DL (ref 8.5–10.5)
CHLORIDE SERPL-SCNC: 101 MMOL/L (ref 96–112)
CO2 SERPL-SCNC: 26 MMOL/L (ref 20–33)
COLOR UR: YELLOW
CREAT SERPL-MCNC: 0.96 MG/DL (ref 0.5–1.4)
CREAT UR-MCNC: 10.52 MG/DL
EOSINOPHIL # BLD AUTO: 0.13 K/UL (ref 0–0.51)
EOSINOPHIL NFR BLD: 1.7 % (ref 0–6.9)
ERYTHROCYTE [DISTWIDTH] IN BLOOD BY AUTOMATED COUNT: 40.5 FL (ref 35.9–50)
EST. AVERAGE GLUCOSE BLD GHB EST-MCNC: 94 MG/DL
FERRITIN SERPL-MCNC: 71.8 NG/ML (ref 10–291)
GFR SERPLBLD CREATININE-BSD FMLA CKD-EPI: 73 ML/MIN/1.73 M 2
GLOBULIN SER CALC-MCNC: 2.7 G/DL (ref 1.9–3.5)
GLUCOSE SERPL-MCNC: 84 MG/DL (ref 65–99)
GLUCOSE UR STRIP.AUTO-MCNC: NEGATIVE MG/DL
HBA1C MFR BLD: 4.9 % (ref 4–5.6)
HCT VFR BLD AUTO: 40.9 % (ref 37–47)
HCV AB SER QL: NORMAL
HGB BLD-MCNC: 13.6 G/DL (ref 12–16)
IMM GRANULOCYTES # BLD AUTO: 0.01 K/UL (ref 0–0.11)
IMM GRANULOCYTES NFR BLD AUTO: 0.1 % (ref 0–0.9)
IRON SATN MFR SERPL: 24 % (ref 15–55)
IRON SERPL-MCNC: 76 UG/DL (ref 40–170)
KETONES UR STRIP.AUTO-MCNC: NEGATIVE MG/DL
LEUKOCYTE ESTERASE UR QL STRIP.AUTO: NEGATIVE
LYMPHOCYTES # BLD AUTO: 2.47 K/UL (ref 1–4.8)
LYMPHOCYTES NFR BLD: 32.8 % (ref 22–41)
MCH RBC QN AUTO: 30.7 PG (ref 27–33)
MCHC RBC AUTO-ENTMCNC: 33.3 G/DL (ref 33.6–35)
MCV RBC AUTO: 92.3 FL (ref 81.4–97.8)
MICRO URNS: NORMAL
MICROALBUMIN UR-MCNC: <1.2 MG/DL
MICROALBUMIN/CREAT UR: NORMAL MG/G (ref 0–30)
MONOCYTES # BLD AUTO: 0.51 K/UL (ref 0–0.85)
MONOCYTES NFR BLD AUTO: 6.8 % (ref 0–13.4)
NEUTROPHILS # BLD AUTO: 4.38 K/UL (ref 2–7.15)
NEUTROPHILS NFR BLD: 58.1 % (ref 44–72)
NITRITE UR QL STRIP.AUTO: NEGATIVE
NRBC # BLD AUTO: 0 K/UL
NRBC BLD-RTO: 0 /100 WBC
PH UR STRIP.AUTO: 7.5 [PH] (ref 5–8)
PLATELET # BLD AUTO: 260 K/UL (ref 164–446)
PMV BLD AUTO: 10.5 FL (ref 9–12.9)
POTASSIUM SERPL-SCNC: 4.5 MMOL/L (ref 3.6–5.5)
PROT SERPL-MCNC: 6.9 G/DL (ref 6–8.2)
PROT UR QL STRIP: NEGATIVE MG/DL
RBC # BLD AUTO: 4.43 M/UL (ref 4.2–5.4)
RBC UR QL AUTO: NEGATIVE
SODIUM SERPL-SCNC: 137 MMOL/L (ref 135–145)
SP GR UR STRIP.AUTO: 1
TIBC SERPL-MCNC: 323 UG/DL (ref 250–450)
UIBC SERPL-MCNC: 247 UG/DL (ref 110–370)
UROBILINOGEN UR STRIP.AUTO-MCNC: 0.2 MG/DL
WBC # BLD AUTO: 7.5 K/UL (ref 4.8–10.8)

## 2023-01-04 PROCEDURE — 83540 ASSAY OF IRON: CPT

## 2023-01-04 PROCEDURE — 82043 UR ALBUMIN QUANTITATIVE: CPT

## 2023-01-04 PROCEDURE — 82570 ASSAY OF URINE CREATININE: CPT

## 2023-01-04 PROCEDURE — 81003 URINALYSIS AUTO W/O SCOPE: CPT

## 2023-01-04 PROCEDURE — 82728 ASSAY OF FERRITIN: CPT

## 2023-01-04 PROCEDURE — 80053 COMPREHEN METABOLIC PANEL: CPT

## 2023-01-04 PROCEDURE — 83550 IRON BINDING TEST: CPT

## 2023-01-04 PROCEDURE — 85025 COMPLETE CBC W/AUTO DIFF WBC: CPT

## 2023-01-04 PROCEDURE — 83036 HEMOGLOBIN GLYCOSYLATED A1C: CPT

## 2023-01-04 PROCEDURE — 36415 COLL VENOUS BLD VENIPUNCTURE: CPT

## 2023-01-04 PROCEDURE — 81256 HFE GENE: CPT

## 2023-01-04 PROCEDURE — 86803 HEPATITIS C AB TEST: CPT

## 2023-01-05 ENCOUNTER — TELEPHONE (OUTPATIENT)
Dept: MEDICAL GROUP | Facility: MEDICAL CENTER | Age: 48
End: 2023-01-05
Payer: COMMERCIAL

## 2023-01-05 PROBLEM — Z83.49 FAMILY HISTORY OF HEMOCHROMATOSIS: Status: ACTIVE | Noted: 2023-01-05

## 2023-01-05 RX ORDER — ALPRAZOLAM 0.5 MG/1
TABLET ORAL
COMMUNITY
Start: 2022-12-18 | End: 2023-02-27

## 2023-01-06 NOTE — TELEPHONE ENCOUNTER
Notified with results, iron levels normal, hemochromatosis genetic panel pending.  Patient already had her lipid and vitamin D level at work, she will try to upload that in the system and send that to me so that I have those results.  She is currently tapering off birth control pills and the Xanax at the same time and has had hot flashes the last few evenings.  Advised her to taper off 1 medication at a time, she could stay off the birth control pills and use the Xanax for the time being, and then work on Xanax taper later.  She also can follow-up with her gynecologist if she has persistent hot flashes she may be in the perimenopausal state.

## 2023-01-13 ENCOUNTER — TELEPHONE (OUTPATIENT)
Dept: MEDICAL GROUP | Facility: MEDICAL CENTER | Age: 48
End: 2023-01-13
Payer: COMMERCIAL

## 2023-01-13 DIAGNOSIS — R91.8 LUNG MASS: ICD-10-CM

## 2023-01-13 LAB
HFE GENE MUT ANL BLD/T: NORMAL
HFE P.C282Y BLD/T QL: NEGATIVE
HFE P.H63D BLD/T QL: NORMAL
HFE P.S65C BLD/T QL: NEGATIVE

## 2023-01-15 ENCOUNTER — TELEPHONE (OUTPATIENT)
Dept: MEDICAL GROUP | Facility: MEDICAL CENTER | Age: 48
End: 2023-01-15
Payer: COMMERCIAL

## 2023-01-16 NOTE — TELEPHONE ENCOUNTER
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  Iron panel negative.

## 2023-01-22 ENCOUNTER — OFFICE VISIT (OUTPATIENT)
Dept: URGENT CARE | Facility: CLINIC | Age: 48
End: 2023-01-22
Payer: COMMERCIAL

## 2023-01-22 VITALS
WEIGHT: 135 LBS | TEMPERATURE: 97.5 F | HEART RATE: 69 BPM | OXYGEN SATURATION: 98 % | RESPIRATION RATE: 14 BRPM | BODY MASS INDEX: 21.69 KG/M2 | DIASTOLIC BLOOD PRESSURE: 66 MMHG | SYSTOLIC BLOOD PRESSURE: 102 MMHG | HEIGHT: 66 IN

## 2023-01-22 DIAGNOSIS — J02.9 PHARYNGITIS, UNSPECIFIED ETIOLOGY: ICD-10-CM

## 2023-01-22 LAB
INT CON NEG: NORMAL
INT CON POS: NORMAL
S PYO AG THROAT QL: NEGATIVE

## 2023-01-22 PROCEDURE — 87880 STREP A ASSAY W/OPTIC: CPT

## 2023-01-22 PROCEDURE — 99213 OFFICE O/P EST LOW 20 MIN: CPT

## 2023-01-22 ASSESSMENT — ENCOUNTER SYMPTOMS
COUGH: 0
FEVER: 0
SORE THROAT: 1
WHEEZING: 0
CHILLS: 0
SPUTUM PRODUCTION: 0
SINUS PAIN: 0
STRIDOR: 0
SHORTNESS OF BREATH: 0
DIAPHORESIS: 0
WEIGHT LOSS: 0
HEMOPTYSIS: 0

## 2023-01-22 ASSESSMENT — FIBROSIS 4 INDEX: FIB4 SCORE: 1.15

## 2023-01-22 NOTE — PROGRESS NOTES
Subjective:   Jing Ritter is a 47 y.o. female who presents for Pharyngitis (X6days, headache, Cough, loss of voice, )      HPI: This is a 47-year-old female who presents today for evaluation of sore throat.  Patient reports developing URI symptoms 6 days ago.  She reports almost complete resolution in symptoms except for sore throat.  Patient reports taking ibuprofen without any improvement in symptoms.  She reports loss of voice 2 days ago.  No wheezing or shortness of breath.  She denies strep exposure.      Review of Systems   Constitutional:  Negative for chills, diaphoresis, fever, malaise/fatigue and weight loss.   HENT:  Positive for sore throat. Negative for congestion, ear discharge, ear pain and sinus pain.    Respiratory:  Negative for cough, hemoptysis, sputum production, shortness of breath, wheezing and stridor.      Medications:    Current Outpatient Medications on File Prior to Visit   Medication Sig Dispense Refill    ALPRAZolam (XANAX) 0.5 MG Tab       sertraline (ZOLOFT) 100 MG Tab TAKE ONE TABLET BY MOUTH DAILY (Patient taking differently: 50 mg.) 90 Tablet 1    famciclovir (FAMVIR) 250 MG Tab Take 2 tab tid x 5 days as needed for flare up 60 Tablet 11    SUZY 3-0.02 MG per tablet        No current facility-administered medications on file prior to visit.        Allergies:   Patient has no known allergies.    Problem List:   Patient Active Problem List   Diagnosis    Anxiety    Uses birth control    Preventative health care    Insomnia    Dyslipidemia    History of basal cell carcinoma    Decreased GFR    Anisocoria    Constipation    Family history of hemochromatosis        Surgical History:  Past Surgical History:   Procedure Laterality Date    [place] BREAST AUGMENTATION WITH IMPLANT  2006       Past Social Hx:   Social History     Tobacco Use    Smoking status: Never    Smokeless tobacco: Never   Vaping Use    Vaping Use: Never used   Substance Use Topics    Alcohol use: Not Currently      "Comment: 2 drinks a month    Drug use: Never          Problem list, medications, and allergies reviewed by myself today in Epic.     Objective:     /66   Pulse 69   Temp 36.4 °C (97.5 °F) (Temporal)   Resp 14   Ht 1.664 m (5' 5.5\")   Wt 61.2 kg (135 lb)   SpO2 98%   BMI 22.12 kg/m²     Physical Exam  Vitals and nursing note reviewed.   Constitutional:       General: She is not in acute distress.     Appearance: Normal appearance. She is normal weight. She is not ill-appearing or toxic-appearing.   HENT:      Head: Normocephalic and atraumatic.      Right Ear: Tympanic membrane, ear canal and external ear normal. There is no impacted cerumen.      Left Ear: Tympanic membrane, ear canal and external ear normal. There is no impacted cerumen.      Nose: Nose normal. No congestion or rhinorrhea.      Mouth/Throat:      Mouth: Mucous membranes are moist.      Pharynx: Oropharynx is clear. Uvula midline. Posterior oropharyngeal erythema present. No oropharyngeal exudate.      Tonsils: No tonsillar exudate. 0 on the right. 0 on the left.   Cardiovascular:      Rate and Rhythm: Normal rate and regular rhythm.      Pulses: Normal pulses.      Heart sounds: Normal heart sounds. No murmur heard.    No friction rub. No gallop.   Pulmonary:      Effort: Pulmonary effort is normal. No respiratory distress.      Breath sounds: Normal breath sounds. No stridor. No wheezing, rhonchi or rales.   Chest:      Chest wall: No tenderness.   Musculoskeletal:      Cervical back: Neck supple. No tenderness.   Lymphadenopathy:      Cervical: No cervical adenopathy.   Skin:     General: Skin is warm and dry.      Capillary Refill: Capillary refill takes less than 2 seconds.   Neurological:      General: No focal deficit present.      Mental Status: She is alert and oriented to person, place, and time. Mental status is at baseline.      Cranial Nerves: No cranial nerve deficit.      Motor: No weakness.      Gait: Gait normal. "   Psychiatric:         Mood and Affect: Mood normal.         Behavior: Behavior normal.         Thought Content: Thought content normal.         Judgment: Judgment normal.       Assessment/Plan:     Diagnosis and associated orders:   1. Pharyngitis, unspecified etiology  POCT Rapid Strep A             Comments/MDM:   Pt is clinically stable at today's acute urgent care visit.  No acute distress noted. Appropriate for outpatient management at this time.     Problem.  Patient is not ill or toxic appearing in clinic today.  Vital signs are stable.  POC rapid strep is negative in clinic today. I have discussed with patient that symptoms are viral in etiology. I have recommended supportive care to include; Increased fluids, fluids with honey, rest, salt water gargles, alternating Tylenol and/or ibuprofen per manufactures instructions for symptomatic relief and fevers, and the use of a humidifier. Patient is to return to UC or go to ER for any new or worsening s/s, and follow up with PCP for recheck. Patient is agreeable with plan of care and verbalized good understanding.  Work Note provided           Discussed DDx, management options (risks,benefits, and alternatives to planned treatment), natural progression and supportive care.  Expressed understanding and the treatment plan was agreed upon. Questions were encouraged and answered   Return to urgent care prn if new or worsening sx or if there is no improvement in condition prn.    Educated in Red flags and indications to immediately call 911 or present to the Emergency Department.   Advised the patient to follow-up with the primary care physician for recheck, reevaluation, and consideration of further management.    I personally reviewed prior external notes and test results pertinent to today's visit.  I have independently reviewed and interpreted all diagnostics ordered during this urgent care acute visit.       Please note that this dictation was created using voice  recognition software. I have made a reasonable attempt to correct obvious errors, but I expect that there are errors of grammar and possibly content that I did not discover before finalizing the note.    This note was electronically signed by ANTONI Ortega

## 2023-01-22 NOTE — LETTER
January 22, 2023    To Whom It May Concern:         This is confirmation that Jing Ritter attended her scheduled appointment with YAYO Gusman on 1/22/23. Please excuse her from work 1/23/23-1/25/23.         If you have any questions please do not hesitate to call me at the phone number listed below.      Sincerely,          PRASHANTH Gusman.  [phone]

## 2023-01-27 RX ORDER — AZITHROMYCIN 250 MG/1
TABLET, FILM COATED ORAL
Qty: 6 TABLET | Refills: 0 | Status: SHIPPED | OUTPATIENT
Start: 2023-01-27 | End: 2023-07-07

## 2023-04-12 ENCOUNTER — TELEPHONE (OUTPATIENT)
Dept: MEDICAL GROUP | Facility: MEDICAL CENTER | Age: 48
End: 2023-04-12
Payer: COMMERCIAL

## 2023-04-12 DIAGNOSIS — Z78.0 MENOPAUSE: ICD-10-CM

## 2023-04-30 DIAGNOSIS — F41.9 ANXIETY: ICD-10-CM

## 2023-04-30 RX ORDER — ALPRAZOLAM 0.5 MG/1
TABLET ORAL
COMMUNITY
Start: 2023-03-29 | End: 2023-07-07

## 2023-07-07 DIAGNOSIS — G47.00 INSOMNIA, UNSPECIFIED TYPE: Chronic | ICD-10-CM

## 2023-07-07 RX ORDER — ESTROGEN,CON/M-PROGEST ACET 0.625-2.5
TABLET ORAL
COMMUNITY
Start: 2023-07-06

## 2023-07-27 ENCOUNTER — APPOINTMENT (RX ONLY)
Dept: URBAN - METROPOLITAN AREA CLINIC 35 | Facility: CLINIC | Age: 48
Setting detail: DERMATOLOGY
End: 2023-07-27

## 2023-07-27 DIAGNOSIS — Z85.828 PERSONAL HISTORY OF OTHER MALIGNANT NEOPLASM OF SKIN: ICD-10-CM

## 2023-07-27 DIAGNOSIS — L81.4 OTHER MELANIN HYPERPIGMENTATION: ICD-10-CM

## 2023-07-27 DIAGNOSIS — L82.1 OTHER SEBORRHEIC KERATOSIS: ICD-10-CM

## 2023-07-27 DIAGNOSIS — Z71.89 OTHER SPECIFIED COUNSELING: ICD-10-CM

## 2023-07-27 DIAGNOSIS — D22 MELANOCYTIC NEVI: ICD-10-CM

## 2023-07-27 PROBLEM — D22.5 MELANOCYTIC NEVI OF TRUNK: Status: ACTIVE | Noted: 2023-07-27

## 2023-07-27 PROCEDURE — 99213 OFFICE O/P EST LOW 20 MIN: CPT

## 2023-07-27 PROCEDURE — ? COUNSELING

## 2023-07-27 ASSESSMENT — LOCATION DETAILED DESCRIPTION DERM
LOCATION DETAILED: RIGHT INFERIOR UPPER BACK
LOCATION DETAILED: RIGHT MID-UPPER BACK
LOCATION DETAILED: RIGHT CENTRAL FRONTAL SCALP
LOCATION DETAILED: RIGHT SUPERIOR UPPER BACK

## 2023-07-27 ASSESSMENT — LOCATION SIMPLE DESCRIPTION DERM
LOCATION SIMPLE: RIGHT SCALP
LOCATION SIMPLE: RIGHT UPPER BACK

## 2023-07-27 ASSESSMENT — LOCATION ZONE DERM
LOCATION ZONE: TRUNK
LOCATION ZONE: SCALP

## 2023-08-17 ENCOUNTER — APPOINTMENT (RX ONLY)
Dept: URBAN - METROPOLITAN AREA CLINIC 35 | Facility: CLINIC | Age: 48
Setting detail: DERMATOLOGY
End: 2023-08-17

## 2023-08-17 DIAGNOSIS — L82.1 OTHER SEBORRHEIC KERATOSIS: ICD-10-CM

## 2023-08-17 DIAGNOSIS — Z85.828 PERSONAL HISTORY OF OTHER MALIGNANT NEOPLASM OF SKIN: ICD-10-CM

## 2023-08-17 PROCEDURE — 99212 OFFICE O/P EST SF 10 MIN: CPT

## 2023-08-17 PROCEDURE — ? COUNSELING

## 2023-08-17 ASSESSMENT — LOCATION DETAILED DESCRIPTION DERM
LOCATION DETAILED: RIGHT DISTAL DORSAL FOREARM
LOCATION DETAILED: RIGHT CENTRAL FRONTAL SCALP
LOCATION DETAILED: RIGHT SUPERIOR LATERAL FOREHEAD

## 2023-08-17 ASSESSMENT — LOCATION ZONE DERM
LOCATION ZONE: SCALP
LOCATION ZONE: ARM
LOCATION ZONE: FACE

## 2023-08-17 ASSESSMENT — LOCATION SIMPLE DESCRIPTION DERM
LOCATION SIMPLE: SCALP
LOCATION SIMPLE: RIGHT FOREHEAD
LOCATION SIMPLE: RIGHT FOREARM

## 2023-09-17 ENCOUNTER — TELEPHONE (OUTPATIENT)
Dept: MEDICAL GROUP | Facility: MEDICAL CENTER | Age: 48
End: 2023-09-17
Payer: COMMERCIAL

## 2023-09-17 DIAGNOSIS — G47.00 INSOMNIA, UNSPECIFIED TYPE: Chronic | ICD-10-CM

## 2023-09-17 RX ORDER — ALPRAZOLAM 0.5 MG/1
TABLET ORAL
Qty: 30 TABLET | Refills: 0 | Status: SHIPPED | OUTPATIENT
Start: 2023-09-17 | End: 2023-10-09 | Stop reason: SDUPTHER

## 2023-09-18 NOTE — TELEPHONE ENCOUNTER
Please call the patient, I did send a refill of her alprazolam to her pharmacy.    Please have her schedule a follow-up appointment as we now have guidelines that anyone on controlled substance medications needs to be seen every 3 months and needs to sign a controlled substance contract and provide a once a year urine screening drug test.    Please have her schedule that in office appointment as we have not seen her since December of last year.

## 2023-10-09 ENCOUNTER — TELEPHONE (OUTPATIENT)
Dept: MEDICAL GROUP | Facility: MEDICAL CENTER | Age: 48
End: 2023-10-09

## 2023-10-09 ENCOUNTER — OFFICE VISIT (OUTPATIENT)
Dept: MEDICAL GROUP | Facility: MEDICAL CENTER | Age: 48
End: 2023-10-09
Payer: COMMERCIAL

## 2023-10-09 ENCOUNTER — HOSPITAL ENCOUNTER (OUTPATIENT)
Facility: MEDICAL CENTER | Age: 48
End: 2023-10-09
Attending: INTERNAL MEDICINE
Payer: COMMERCIAL

## 2023-10-09 VITALS
DIASTOLIC BLOOD PRESSURE: 70 MMHG | WEIGHT: 141 LBS | BODY MASS INDEX: 23.49 KG/M2 | SYSTOLIC BLOOD PRESSURE: 124 MMHG | TEMPERATURE: 98.9 F | OXYGEN SATURATION: 97 % | HEIGHT: 65 IN | RESPIRATION RATE: 16 BRPM | HEART RATE: 67 BPM

## 2023-10-09 DIAGNOSIS — F41.9 ANXIETY: ICD-10-CM

## 2023-10-09 DIAGNOSIS — F13.20 SEDATIVE HYPNOTIC OR ANXIOLYTIC DEPENDENCE (HCC): ICD-10-CM

## 2023-10-09 DIAGNOSIS — Z12.31 ENCOUNTER FOR SCREENING MAMMOGRAM FOR BREAST CANCER: ICD-10-CM

## 2023-10-09 DIAGNOSIS — G47.00 INSOMNIA, UNSPECIFIED TYPE: Chronic | ICD-10-CM

## 2023-10-09 DIAGNOSIS — N95.1 PERIMENOPAUSAL: ICD-10-CM

## 2023-10-09 DIAGNOSIS — Z00.00 PREVENTATIVE HEALTH CARE: Chronic | ICD-10-CM

## 2023-10-09 PROCEDURE — 3074F SYST BP LT 130 MM HG: CPT | Performed by: INTERNAL MEDICINE

## 2023-10-09 PROCEDURE — 99214 OFFICE O/P EST MOD 30 MIN: CPT | Performed by: INTERNAL MEDICINE

## 2023-10-09 PROCEDURE — 3078F DIAST BP <80 MM HG: CPT | Performed by: INTERNAL MEDICINE

## 2023-10-09 PROCEDURE — G0481 DRUG TEST DEF 8-14 CLASSES: HCPCS

## 2023-10-09 RX ORDER — ALPRAZOLAM 0.5 MG/1
0.5 TABLET ORAL NIGHTLY PRN
Qty: 30 TABLET | Refills: 1 | Status: SHIPPED | OUTPATIENT
Start: 2023-10-18 | End: 2023-12-21

## 2023-10-09 ASSESSMENT — FIBROSIS 4 INDEX: FIB4 SCORE: 1.15

## 2023-10-09 ASSESSMENT — PATIENT HEALTH QUESTIONNAIRE - PHQ9: CLINICAL INTERPRETATION OF PHQ2 SCORE: 0

## 2023-10-09 NOTE — LETTER
[facility]  Azael Nunes M.D.  [address]  [place] [address]  Fax: [phone]   Authorization for Release/Disclosure of   Protected Health Information   Name: BING COLLINS : 1975 SSN: [id number]   Address: [address] [place] [address]  [place] [address] Phone:    [phone] (home)    I authorize the entity listed below to release/disclose the PHI below to:   [facility]/Azael Nunes M.D. and Azael Nunes M.D.   Provider or Entity Name:                                                         Dr Soni (GYN)   Address   City, [facility], [facility]   Phone:      Fax:               [phone]   Reason for request: continuity of care   Information to be released: OLD RECORDS   [  ] LAST COLONOSCOPY,  including any PATH REPORT and follow-up  [  ] LAST FIT/COLOGUARD RESULT [  ] LAST DEXA  [  ] LAST MAMMOGRAM  [  ] LAST PAP  [  ] LAST LABS [  ] RETINA EXAM REPORT  [  ] IMMUNIZATION RECORDS  [ x ] Release all info      [  ] Check here and initial the line next to each item to release ALL health information INCLUDING  _____ Care and treatment for drug and / or alcohol abuse  _____ HIV testing, infection status, or AIDS  _____ Genetic Testing    DATES OF SERVICE OR TIME PERIOD TO BE DISCLOSED: _____________  I understand and acknowledge that:  * This Authorization may be revoked at any time by you in writing, except if your health information has already been used or disclosed.  * Your health information that will be used or disclosed as a result of you signing this authorization could be re-disclosed by the recipient. If this occurs, your re-disclosed health information may no longer be protected by State or Federal laws.  * You may refuse to sign this Authorization. Your refusal will not affect your ability to obtain treatment.  * This Authorization becomes effective upon signing and will  on (date) __________.      If no date is indicated, this Authorization will  one (1)  year from the signature date.    Name: Jing Ritter  Signature:                            Continuity of Care       Date:   10/10/2023     PLEASE FAX REQUESTED RECORDS BACK TO: [phone]

## 2023-10-09 NOTE — LETTER
[facility]  Azael Nunes M.D.  [address]  [place] [address]  Fax: [phone]   Authorization for Release/Disclosure of   Protected Health Information   Name: BING COLLINS : 1975 SSN: [id number]   Address: [address] [place] [address]  [place] [address] Phone:    [phone] (home)    I authorize the entity listed below to release/disclose the PHI below to:   [facility]/Azael Nunes M.D. and Azael Nunes M.D.   Provider or Entity Name:                                                      Dr Ortiz ([facility]   Address   City, [facility], [facility]   Phone:      Fax:                 [phone]   Reason for request: continuity of care   Information to be released: OLD RECORDS   [  ] LAST COLONOSCOPY,  including any PATH REPORT and follow-up  [  ] LAST FIT/COLOGUARD RESULT [  ] LAST DEXA  [  ] LAST MAMMOGRAM  [  ] LAST PAP  [  ] LAST LABS [  ] RETINA EXAM REPORT  [  ] IMMUNIZATION RECORDS  [ x ] Release all info      [  ] Check here and initial the line next to each item to release ALL health information INCLUDING  _____ Care and treatment for drug and / or alcohol abuse  _____ HIV testing, infection status, or AIDS  _____ Genetic Testing    DATES OF SERVICE OR TIME PERIOD TO BE DISCLOSED: _____________  I understand and acknowledge that:  * This Authorization may be revoked at any time by you in writing, except if your health information has already been used or disclosed.  * Your health information that will be used or disclosed as a result of you signing this authorization could be re-disclosed by the recipient. If this occurs, your re-disclosed health information may no longer be protected by State or Federal laws.  * You may refuse to sign this Authorization. Your refusal will not affect your ability to obtain treatment.  * This Authorization becomes effective upon signing and will  on (date) __________.      If no date is indicated, this Authorization will   one (1) year from the signature date.    Name: Jing Ritter  Signature:                      Continuity of Care   Date:   10/10/2023     PLEASE FAX REQUESTED RECORDS BACK TO: [phone]

## 2023-10-09 NOTE — PROGRESS NOTES
Subjective     Jing Ritter is a 47 y.o. female who presents with med refill Follow-Up            HPI  Patient is here to follow-up on her medication refill, continues on Xanax 0.5 mg as needed for insomnia.  She tries to cut the medication in half at times or periodically skip a dose, previously she has tried zolpidem, temazepam and has been on the Xanax since 2020.  Most recent refill quantity 30 on September 18.  The medication allows her to achieve restful sleep, no daytime drowsiness or hangover effect, no alcohol with the medication.  She feels refreshed in the morning without side effects.  Anxiety and mood has been stable, she is on sertraline 100 mg daily.  She had been having more difficulty with insomnia, secondary to hot flashes, mood changes, she has been seeing her gynecologist Dr. Soni as well as the [facility] clinic.  She was started on Prempro initially the standard 0.625-2.5 mg dose about 5 months ago for the symptoms, and that she was decreased to the lower dose of the Prempro.  Also after starting Prempro 0.625-2.5 mg, she started seeing the men's [facility] clinic and has been on testosterone/B12/lysozyme injections weekly.  Labs are drawn through the hormone clinic.  Her symptoms have improved as far as hot flashes, mood changes and sleep is somewhat improved although she does still need the Xanax.  She maintains on the sertraline 100 mg for anxiety.  Tries to keep active exercising twice a week since starting school again and she has been under a bit more stress as well as her and her  are living with her sister while her new house gets completed and finalized, she tries to follow a good nutritious diet limiting processed foods.  Also limits caffeinated beverages and alcohol.  Since she started the hormone replacement therapy, she has had some irregular menstrual cycles again.  Pap smear per gynecology.  Medications, allergies, medical history, surgical history, social  "history, family history  reviewed and updated               Current Outpatient Medications   Medication Sig Dispense Refill    ALPRAZolam (XANAX) 0.5 MG Tab TAKE ONE TABLET BY MOUTH EVERY NIGHT AT BEDTIME AS NEEDED FOR SLEEP FOR UP TO 30 DAYS 30 Tablet 0    sertraline (ZOLOFT) 100 MG Tab TAKE ONE TABLET BY MOUTH DAILY 90 Tablet 1    PREMPRO 0.625-2.5 MG Tab       famciclovir (FAMVIR) 250 MG Tab Take 2 tab tid x 5 days as needed for flare up 60 Tablet 11     No current facility-administered medications for this visit.         Patient Active Problem List   Diagnosis    Anxiety    Uses birth control    Preventative health care    Insomnia    Dyslipidemia    History of basal cell carcinoma    Decreased GFR    Anisocoria    Constipation    Family history of hemochromatosis       Depression Screening  Little interest or pleasure in doing things?  0 - not at all  Feeling down, depressed , or hopeless? 0 - not at all  Patient Health Questionnaire Score: 0                Patient Care Team:  Azael Nunes M.D. as PCP - General      ROS           Objective     /70 (BP Location: Right arm, Patient Position: Sitting, BP Cuff Size: Adult)   Pulse 67   Temp 37.2 °C (98.9 °F)   Resp 16   Ht 1.651 m (5' 5\")   Wt 64 kg (141 lb)   SpO2 97%   BMI 23.46 kg/m²      Physical Exam  Vitals and nursing note reviewed.   Constitutional:       Appearance: Normal appearance.   HENT:      Head: Normocephalic and atraumatic.      Right Ear: External ear normal.      Left Ear: External ear normal.   Eyes:      Conjunctiva/sclera: Conjunctivae normal.   Cardiovascular:      Rate and Rhythm: Normal rate and regular rhythm.      Heart sounds: Normal heart sounds.   Pulmonary:      Effort: Pulmonary effort is normal.      Breath sounds: Normal breath sounds.   Abdominal:      General: There is no distension.   Musculoskeletal:      Cervical back: No rigidity.   Skin:     Coloration: Skin is not jaundiced.   Neurological:      General: " No focal deficit present.      Mental Status: She is alert.   Psychiatric:         Mood and Affect: Mood normal.         Behavior: Behavior normal.                           Assessment & Plan        Assessment  #!  Chronic insomnia stable on Xanax 0.5 mg daily to every other days she has been trying to periodically skip a dose or use less of a dose, the medication does not cause daytime drowsiness or hangover effect, allows her to achieve sleep, she still manages to maintain a regular exercise program, limits caffeine intake as well as alcohol    #2 chronic benzodiazepine use with Xanax 0.5 mg most recent refill September 18    #3 perimenopausal, followed by gynecology on Prempro low-dose of 0.45-1.5 mg, having tapered from the higher dose, she also is followed by the [facility] on testosterone injections, B12 injections, lysozyme injections, she has felt better since starting the hormone replacement therapy and starting the injections.  Labs are followed closely by the health clinic.    #4 anxiety stable on sertraline 100 mg    #5 hereditary hemochromatosis heterozygous H63D, negative C282Y and S65C, recent iron panel in January normal saturation, iron, ferritin    #6 history of basal cell carcinoma followed by dermatology uses sunscreen    Plan  #1 continue follow-up with gynecology and continue the Prempro 0.45-1.5 mg daily    #2 she can continue the [facility] testosterone/B12/lysozyme injections    #3 I did tell her that I do not have any solid data indicating that testosterone injections, B12 injections and in particular B12 levels that are normal, would be beneficial but I do not have any issues with her being on the injections since they are monitoring her lab tests and she feels better which is the primary goal of any treatment program without side effects, perhaps she could consider having her B12 level checked at her next lab and if B12 levels are normal she can consider foregoing  the injections and instead taking supplemental B12 orally 1000 mcg daily over-the-counter as there is no history of pernicious anemia, also explained that normally we would not follow testosterone levels and based testosterone amounts or the frequency of injections based upon blood tests in women for postmenopausal symptoms as normally we would just monitor symptom response to treatment, although with the injections I do see the rationale for checking blood levels of testosterone periodically compared to bioidentical hormone creams or topical administration formulations testosterone     #4 mammogram    #5 old records from dermatology    #6 she will get the influenza vaccine at work    #7 continue to work on a good nutrition and exercise program, continue limiting caffeine, work on sleep hygiene, long-term chronic benzodiazepine use may lead to dependence, habituation, memory loss but she is working on decreasing the utilization of that medication    #8 continue Zoloft 100 mg daily    #9  reviewed and consistent    #10 controlled substance contract signed    #11 urine drug screen    #12 her symptoms are stable, I am comfortable with her following up in 6 months rather than 3 months to monitor her use of the benzodiazepines, should anything change in the interim she can call or return to clinic or send us a [employer] message    #13  reviewed and consistent medication use.  I do believe the benefits of Xanax therapy for sleep outweighs the potential side effects.

## 2023-10-12 LAB
1OH-MIDAZOLAM UR QL SCN: NOT DETECTED
6MAM UR QL: NOT DETECTED
7AMINOCLONAZEPAM UR QL: NOT DETECTED
A-OH ALPRAZ UR QL: PRESENT
ALPRAZ UR QL: NOT DETECTED
AMPHET UR QL SCN: NOT DETECTED
ANNOTATION COMMENT IMP: NORMAL
ANNOTATION COMMENT IMP: NORMAL
BARBITURATES UR QL: NOT DETECTED
BUPRENORPHINE UR QL: NOT DETECTED
BZE UR QL: NOT DETECTED
CARBOXYTHC UR QL: NOT DETECTED
CARISOPRODOL UR QL: NOT DETECTED
CLONAZEPAM UR QL: NOT DETECTED
CODEINE UR QL: NOT DETECTED
DIAZEPAM UR QL: NOT DETECTED
ETHYL GLUCURONIDE UR QL: PRESENT
FENTANYL UR QL: NOT DETECTED
GABAPENTIN UR QL: NOT DETECTED
HYDROCODONE UR QL: NOT DETECTED
HYDROMORPHONE UR QL: NOT DETECTED
LORAZEPAM UR QL: NOT DETECTED
MDA UR QL: NOT DETECTED
MDEA UR QL: NOT DETECTED
MDMA UR QL: NOT DETECTED
MEPERIDINE UR QL: NOT DETECTED
METHADONE UR QL: NOT DETECTED
METHAMPHET UR QL: NOT DETECTED
MIDAZOLAM UR QL SCN: NOT DETECTED
MORPHINE UR QL: NOT DETECTED
NALOXONE UR QL SCN: NOT DETECTED
NORBUPRENORPHINE UR QL CFM: NOT DETECTED
NORDIAZEPAM UR QL: NOT DETECTED
NORFENTANYL UR QL: NOT DETECTED
NORHYDROCODONE UR QL CFM: NOT DETECTED
NOROXYCODONE UR QL CFM: NOT DETECTED
NOROXYMORPH CO100 Q0458: NOT DETECTED
OXAZEPAM UR QL: NOT DETECTED
OXYCODONE UR QL: NOT DETECTED
OXYMORPHONE UR QL: NOT DETECTED
PATHOLOGY STUDY: NORMAL
PCP UR QL: NOT DETECTED
PHENTERMINE UR QL: NOT DETECTED
PPAA UR QL: NOT DETECTED
PREGABALIN UR QL SCN: NOT DETECTED
SERVICE CMNT-IMP: NORMAL
TAPENADOL OSULF CO200 Q0473: NOT DETECTED
TAPENTADOL UR QL SCN: NOT DETECTED
TEMAZEPAM UR QL: NOT DETECTED
TRAMADOL UR QL: NOT DETECTED
ZOLPIDEM PHENYL-4-CARB UR QL SCN: NOT DETECTED
ZOLPIDEM UR QL: NOT DETECTED

## 2023-12-20 DIAGNOSIS — G47.00 INSOMNIA, UNSPECIFIED TYPE: Chronic | ICD-10-CM

## 2023-12-21 DIAGNOSIS — G47.00 INSOMNIA, UNSPECIFIED TYPE: Chronic | ICD-10-CM

## 2023-12-21 RX ORDER — ALPRAZOLAM 0.5 MG/1
TABLET ORAL
Qty: 30 TABLET | Refills: 1 | Status: SHIPPED | OUTPATIENT
Start: 2023-12-21 | End: 2024-03-15

## 2024-02-20 RX ORDER — SERTRALINE HYDROCHLORIDE 100 MG/1
100 TABLET, FILM COATED ORAL DAILY
Qty: 90 TABLET | Refills: 1 | Status: SHIPPED | OUTPATIENT
Start: 2024-02-20

## 2024-02-23 PROBLEM — M25.569 KNEE PAIN: Status: ACTIVE | Noted: 2024-02-23

## 2024-03-14 DIAGNOSIS — G47.00 INSOMNIA, UNSPECIFIED TYPE: Chronic | ICD-10-CM

## 2024-03-15 DIAGNOSIS — G47.00 INSOMNIA, UNSPECIFIED TYPE: Chronic | ICD-10-CM

## 2024-03-15 DIAGNOSIS — F41.9 ANXIETY: ICD-10-CM

## 2024-03-15 RX ORDER — ALPRAZOLAM 0.5 MG/1
0.5 TABLET ORAL
Qty: 30 TABLET | Refills: 1 | Status: SHIPPED | OUTPATIENT
Start: 2024-03-15 | End: 2024-04-14

## 2024-03-15 RX ORDER — ALPRAZOLAM 0.5 MG/1
TABLET ORAL
Qty: 30 TABLET | Refills: 1 | Status: SHIPPED | OUTPATIENT
Start: 2024-03-15 | End: 2024-03-15 | Stop reason: SDUPTHER

## 2024-05-30 DIAGNOSIS — G47.00 INSOMNIA, UNSPECIFIED TYPE: Chronic | ICD-10-CM

## 2024-05-31 DIAGNOSIS — F41.9 ANXIETY: ICD-10-CM

## 2024-05-31 DIAGNOSIS — G47.00 INSOMNIA, UNSPECIFIED TYPE: Chronic | ICD-10-CM

## 2024-05-31 RX ORDER — ALPRAZOLAM 0.5 MG/1
TABLET ORAL
Qty: 30 TABLET | OUTPATIENT
Start: 2024-05-31

## 2024-05-31 RX ORDER — ALPRAZOLAM 0.5 MG/1
0.5 TABLET ORAL
Qty: 30 TABLET | Refills: 1 | Status: SHIPPED | OUTPATIENT
Start: 2024-05-31 | End: 2024-06-30

## 2024-05-31 NOTE — TELEPHONE ENCOUNTER
Received request via: Pharmacy    Was the patient seen in the last year in this department? Yes    Does the patient have an active prescription (recently filled or refills available) for medication(s) requested? No    [facility]    Does the patient have assisted Plus and need 100 day supply (blood pressure, diabetes and cholesterol meds only)? Patient does not have SCP

## 2024-07-25 ENCOUNTER — APPOINTMENT (RX ONLY)
Dept: URBAN - METROPOLITAN AREA CLINIC 35 | Facility: CLINIC | Age: 49
Setting detail: DERMATOLOGY
End: 2024-07-25

## 2024-07-25 DIAGNOSIS — Z71.89 OTHER SPECIFIED COUNSELING: ICD-10-CM

## 2024-07-25 DIAGNOSIS — D22 MELANOCYTIC NEVI: ICD-10-CM

## 2024-07-25 DIAGNOSIS — L81.4 OTHER MELANIN HYPERPIGMENTATION: ICD-10-CM

## 2024-07-25 DIAGNOSIS — Z85.828 PERSONAL HISTORY OF OTHER MALIGNANT NEOPLASM OF SKIN: ICD-10-CM

## 2024-07-25 DIAGNOSIS — L82.1 OTHER SEBORRHEIC KERATOSIS: ICD-10-CM

## 2024-07-25 PROBLEM — D22.5 MELANOCYTIC NEVI OF TRUNK: Status: ACTIVE | Noted: 2024-07-25

## 2024-07-25 PROCEDURE — 99213 OFFICE O/P EST LOW 20 MIN: CPT

## 2024-07-25 PROCEDURE — ? COUNSELING

## 2024-07-25 ASSESSMENT — LOCATION DETAILED DESCRIPTION DERM
LOCATION DETAILED: RIGHT CENTRAL FRONTAL SCALP
LOCATION DETAILED: LEFT CENTRAL ZYGOMA
LOCATION DETAILED: RIGHT INFERIOR UPPER BACK
LOCATION DETAILED: RIGHT MID-UPPER BACK
LOCATION DETAILED: RIGHT SUPERIOR UPPER BACK

## 2024-07-25 ASSESSMENT — LOCATION SIMPLE DESCRIPTION DERM
LOCATION SIMPLE: LEFT ZYGOMA
LOCATION SIMPLE: RIGHT UPPER BACK
LOCATION SIMPLE: RIGHT SCALP

## 2024-07-25 ASSESSMENT — LOCATION ZONE DERM
LOCATION ZONE: TRUNK
LOCATION ZONE: SCALP
LOCATION ZONE: FACE

## 2024-08-18 DIAGNOSIS — G47.00 INSOMNIA, UNSPECIFIED TYPE: Chronic | ICD-10-CM

## 2024-08-19 DIAGNOSIS — F41.9 ANXIETY: ICD-10-CM

## 2024-08-19 DIAGNOSIS — G47.00 INSOMNIA, UNSPECIFIED TYPE: Chronic | ICD-10-CM

## 2024-08-19 RX ORDER — ALPRAZOLAM 0.5 MG
0.5 TABLET ORAL
Qty: 30 TABLET | Refills: 1 | Status: SHIPPED | OUTPATIENT
Start: 2024-08-19 | End: 2024-09-18

## 2024-08-19 RX ORDER — ALPRAZOLAM 0.5 MG
TABLET ORAL
Qty: 30 TABLET | OUTPATIENT
Start: 2024-08-19

## 2024-09-09 NOTE — TELEPHONE ENCOUNTER
Received request via: Pharmacy    Was the patient seen in the last year in this department? Yes    Does the patient have an active prescription (recently filled or refills available) for medication(s) requested? No    [facility] Name[facility]    Does the patient have intermediate Plus and need 100-day supply? (This applies to ALL medications) Patient does not have SCP

## 2024-09-10 RX ORDER — SERTRALINE HYDROCHLORIDE 100 MG/1
100 TABLET, FILM COATED ORAL DAILY
Qty: 90 TABLET | Refills: 0 | Status: SHIPPED
Start: 2024-09-10

## 2024-10-29 DIAGNOSIS — G47.00 INSOMNIA, UNSPECIFIED TYPE: Chronic | ICD-10-CM

## 2024-10-30 ENCOUNTER — TELEPHONE (OUTPATIENT)
Dept: MEDICAL GROUP | Facility: MEDICAL CENTER | Age: 49
End: 2024-10-30

## 2024-10-30 DIAGNOSIS — G47.00 INSOMNIA, UNSPECIFIED TYPE: Chronic | ICD-10-CM

## 2024-10-30 DIAGNOSIS — F41.9 ANXIETY: ICD-10-CM

## 2024-10-30 RX ORDER — ALPRAZOLAM 0.5 MG
TABLET ORAL
Qty: 30 TABLET | OUTPATIENT
Start: 2024-10-30

## 2024-10-30 RX ORDER — ALPRAZOLAM 0.5 MG
0.5 TABLET ORAL
Qty: 30 TABLET | Refills: 0 | Status: SHIPPED | OUTPATIENT
Start: 2024-10-30 | End: 2024-11-29

## 2024-10-30 NOTE — TELEPHONE ENCOUNTER
Please call the patient, have her schedule her annual exam appointment as we have not seen her in over a year.

## 2024-12-20 RX ORDER — SERTRALINE HYDROCHLORIDE 100 MG/1
100 TABLET, FILM COATED ORAL DAILY
Qty: 90 TABLET | Refills: 0 | Status: SHIPPED | OUTPATIENT
Start: 2024-12-20 | End: 2024-12-23

## 2024-12-23 ENCOUNTER — OFFICE VISIT (OUTPATIENT)
Dept: MEDICAL GROUP | Facility: MEDICAL CENTER | Age: 49
End: 2024-12-23
Payer: COMMERCIAL

## 2024-12-23 ENCOUNTER — TELEPHONE (OUTPATIENT)
Dept: MEDICAL GROUP | Facility: MEDICAL CENTER | Age: 49
End: 2024-12-23

## 2024-12-23 VITALS
DIASTOLIC BLOOD PRESSURE: 62 MMHG | BODY MASS INDEX: 22.99 KG/M2 | OXYGEN SATURATION: 99 % | TEMPERATURE: 97.9 F | SYSTOLIC BLOOD PRESSURE: 118 MMHG | HEART RATE: 75 BPM | HEIGHT: 65 IN | WEIGHT: 138 LBS

## 2024-12-23 DIAGNOSIS — N95.1 PERIMENOPAUSAL: ICD-10-CM

## 2024-12-23 DIAGNOSIS — Z79.899 CHRONIC PRESCRIPTION BENZODIAZEPINE USE: ICD-10-CM

## 2024-12-23 DIAGNOSIS — Z00.00 PREVENTATIVE HEALTH CARE: Chronic | ICD-10-CM

## 2024-12-23 DIAGNOSIS — F41.9 ANXIETY: ICD-10-CM

## 2024-12-23 DIAGNOSIS — G47.00 INSOMNIA, UNSPECIFIED TYPE: Chronic | ICD-10-CM

## 2024-12-23 PROCEDURE — 3074F SYST BP LT 130 MM HG: CPT | Performed by: INTERNAL MEDICINE

## 2024-12-23 PROCEDURE — 99396 PREV VISIT EST AGE 40-64: CPT | Performed by: INTERNAL MEDICINE

## 2024-12-23 PROCEDURE — 3078F DIAST BP <80 MM HG: CPT | Performed by: INTERNAL MEDICINE

## 2024-12-23 RX ORDER — ALPRAZOLAM 0.5 MG
0.5 TABLET ORAL
Qty: 30 TABLET | Refills: 0 | Status: SHIPPED | OUTPATIENT
Start: 2024-12-23 | End: 2025-01-22

## 2024-12-23 ASSESSMENT — PATIENT HEALTH QUESTIONNAIRE - PHQ9: CLINICAL INTERPRETATION OF PHQ2 SCORE: 0

## 2024-12-23 ASSESSMENT — FIBROSIS 4 INDEX: FIB4 SCORE: 1.2

## 2024-12-23 NOTE — TELEPHONE ENCOUNTER
Need old records  (1)  gynecology for the past 12 months  (2)  dermatology for the past 12 months  (3)  [facility] orthopedics records for the last 12 months also any imaging studies that they have  (4) old records [facility] ER and imaging (patient had mammogram and ultrasound pelvis done at [facility] imaging)  (5) annual labs done at [facility] last 12 months

## 2024-12-23 NOTE — LETTER
[facility]  Azael Nunes M.D.  [address]  [place] [address]  Fax: [phone]   Authorization for Release/Disclosure of   Protected Health Information   Name: JING COLLINS : 1975 SSN: [id number]   Address: [address] [place] [address]  [place] [address] Phone:    [phone] (home)    I authorize the entity listed below to release/disclose the PHI below to:   [employer]/Azael Nunes M.D. and Azael Nunes M.D.   Provider or Entity Name:  Dr. Baxter   [facility]   Phone:      Fax:  [phone]   Reason for request: continuity of care   Information to be released:    [  ] LAST COLONOSCOPY,  including any PATH REPORT and follow-up  [  ] LAST FIT/COLOGUARD RESULT [  ] LAST DEXA  [  ] LAST MAMMOGRAM  [  ] LAST PAP  [  ] LAST LABS [  ] RETINA EXAM REPORT  [  ] IMMUNIZATION RECORDS  [ x ] Release last 12 months and any imaging studies      [  ] Check here and initial the line next to each item to release ALL health information INCLUDING  _____ Care and treatment for drug and / or alcohol abuse  _____ HIV testing, infection status, or AIDS  _____ Genetic Testing    DATES OF SERVICE OR TIME PERIOD TO BE DISCLOSED: _____________  I understand and acknowledge that:  * This Authorization may be revoked at any time by you in writing, except if your health information has already been used or disclosed.  * Your health information that will be used or disclosed as a result of you signing this authorization could be re-disclosed by the recipient. If this occurs, your re-disclosed health information may no longer be protected by State or Federal laws.  * You may refuse to sign this Authorization. Your refusal will not affect your ability to obtain treatment.  * This Authorization becomes effective upon signing and will  on (date) __________.      If no date is indicated, this Authorization will  one (1) year from the signature date.    Name: Jing Hillman  Riya  Signature:continuity of care Date:   12/23/2024     PLEASE FAX REQUESTED RECORDS BACK TO: [phone]

## 2024-12-23 NOTE — LETTER
[facility]  Azael Nunes M.D.  [address]  [place] [address]  Fax: [phone]   Authorization for Release/Disclosure of   Protected Health Information   Name: JING COLLINS : 1975 SSN: [id number]   Address: [address] [place] [address]  [place] [address] Phone:    [phone] (home)    I authorize the entity listed below to release/disclose the PHI below to:   [employer] [facility]/Azael Nunes M.D. and Azael Nunes M.D.   Provider or Entity Name:  [facility]   Address   City, State, Zip   Phone:      Fax:   [phone]   Reason for request: continuity of care   Information to be released:    [  ] LAST COLONOSCOPY,  including any PATH REPORT and follow-up  [  ] LAST FIT/COLOGUARD RESULT [  ] LAST DEXA  [  ] LAST MAMMOGRAM  [  ] LAST PAP  [  ] LAST LABS [  ] RETINA EXAM REPORT  [  ] IMMUNIZATION RECORDS  [ x ] Release annual labs last 12 months      [  ] Check here and initial the line next to each item to release ALL health information INCLUDING  _____ Care and treatment for drug and / or alcohol abuse  _____ HIV testing, infection status, or AIDS  _____ Genetic Testing    DATES OF SERVICE OR TIME PERIOD TO BE DISCLOSED: _____________  I understand and acknowledge that:  * This Authorization may be revoked at any time by you in writing, except if your health information has already been used or disclosed.  * Your health information that will be used or disclosed as a result of you signing this authorization could be re-disclosed by the recipient. If this occurs, your re-disclosed health information may no longer be protected by State or Federal laws.  * You may refuse to sign this Authorization. Your refusal will not affect your ability to obtain treatment.  * This Authorization becomes effective upon signing and will  on (date) __________.      If no date is indicated, this Authorization will  one (1) year from the signature date.    Name: Jing HolcombKady  Riya  Signature:continuity of care Date:   12/23/2024     PLEASE FAX REQUESTED RECORDS BACK TO: [phone]

## 2024-12-23 NOTE — PROGRESS NOTES
Subjective     Jing Ritter is a 49 y.o. female who presents with Annual Exam (AWV)            HPI    Here for annual exam   Medications, allergies, medical history, surgical history, social history, family history  reviewed and updated  Sees  optometry reading glasses   Sees  gyn on Prempro per gynecology, she did have evaluation for right abdominal/hip pain and had an ultrasound gynecologic done there and at the [facility] ER turned out to be primarily hip pain generator process treated by her chiropractor, she is off testosterone injections, did [facility]  Sees  dermatology uses sunscreen outdoors  Sees [facility] had evaluation of left knee pain with imaging I do not have those records  Sees dentist twice a year   Exercise 5 days per week goes to gym 3 days per week stair stepper 30 minutes and weights 30 minutes and yoga and walk other days 1.5 miles.Drinks water daily 64 oz, tea in morning, no coffee, no soda, protein shake, etoh one per week   , 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 Uses xanax 0.5 mg nightly last refill has been taking 1/2 a tab nightly with no daytime hangover effect, no change in mood or memory using the medication, no alcohol with the medication  Sees gyn  annually on prempro at [facility]  Changing to vegetarian diet 2 weeks and has felt better making that change, she is taking protein supplements twice a day about 30 g of protein total, no meat, fish, or milk products, does use almond milk          Current Outpatient Medications   Medication Sig Dispense Refill    sertraline (ZOLOFT) 100 MG Tab TAKE 1 TABLET BY MOUTH DAILY 90 Tablet 0    PREMPRO 0.625-2.5 MG Tab        famciclovir (FAMVIR) 250 MG Tab Take 2 tab tid x 5 days as needed for flare up 60 Tablet 11     No current facility-administered medications for this visit.            anxiety  11/08 zoloft since    6/11 on zoloft 100 mg   2/14/13 tapering off zoloft down to 50 mg; then off if possible since trying to become pregnant  8/1/14 on zoloft 50 mg  10/5/15 on zoloft 100 mg per gynecology, tried to taper off this summer but not successful  2/13/17 on zoloft 100 mg per gynecology, will try to taper  9/29/17 on zoloft 100 mg having increased from 50 mg 1.5 weeks ago, now just more anxiety and irritability, add xanax 0.25 mg prn   8/26/19 on zoloft 50 mg   9/22/20 restarted zoloft 50 mg five days ago  10/12/20 on zoloft 100 mg for the past 2 weeks, will continue, offered behavioral health  12/21/21 on zoloft 100 mg alternating with 50 mg, work on taper  3/18/22 refill xanax, uses restoril less frequently  8/8/22 xanax 0.5 mg refill  9/14/22 xanax 0.5 mg refill  10/12/22 xanax 0.5 mg refill  11/16/22 on zoloft 50 mg taper, xanax 0.5 mg refill  10/9/23 on zoloft 100 mg  11/16/23 xanax refill  12/21/23 xanax refill  2/5/24 xanax refill  3/17/24 xanax  4/23/24 xanax  5/31/24 xanax  7/6/24 xanax  8/20/24 xanax  9/22/24 xanax     constipation  4/13/22 [facility] note likely slow transit constipation, labs ordered magnesium, phosphorous, thyroid panel, lactulose hydrogen methane breath test, will schedule for screening colonoscopy  8/24/22 colon per [facility] melanosis, consistent with chronic constipation, medium grade 2 internal hemorrhoids, repeat 10 years     Decreased GFR  8/17/11 bun 15,creat 0.5,GFR 59  10/27/15 bun 15,creat 1.1,GFR>60  11/12/15 bun 17,creat 0.9,GFR 54  11/5/16 bun 19,creat 1.0,GFR>60  9/25/17 bun 17,creat 1.1,GFR 55  11/12/18 bun 15,creat 0.97,GFR>60  10/6/20 bun 13,creat 1.06,GFR,56  1/26/22 bun 13,creat 1.0,GFR 56  1/4/23 bun 16,creat 0.96,GFR 73,urine mac<1.2     Dyslipidemia  8/11 chol 199,trig 162,hdl 60,ldl  "107  9/25/17 chol 197,trig 135,hdl 91,ldl 79  11/12/18 chol 229,trig 75,hdl 101,ldl 113  10/6/20 chol 200,trig 182,hdl 91,ldl 73  1/26/22 chol 247,trig 120,hdl 101,ldl 122     history of basal cell carcinoma  1/5/17  dermatology note biopsy basal cell forehead  7/15/20  skin cancer dermatology note   7/27/22  skin cancer dermatology note   7/27/23  skin cancer dermatology note   8/17/23  skin cancer dermatology note      Insomnia  9/18/23 UDT  9/18/23 contract  9/18/23 xanax refill   10/18/23 xanax  11/16/23 xanax refill  12/21/23 xanax refill  2/5/24 xanax refill    perimenopausal  5/8/23  gyn note start prempro 0.625-2.5 mg  10/9/23 on prempro 0.45-1.5 mg daily per  gynecology, had been on prepro 0.625-2.5 mg about 5 months ago, also sees [facility] currently on testosterone injections/B12/lysozyme weekly      Preventative  10/29/19 tdap  1/26/22 vit d 60  4/28/22 mammogram   5/11/22 pap per  gyn   8/24/22 colon per [facility] melanosis, consistent with chronic constipation, medium grade 2 internal hemorrhoids, repeat 10 years  12/12/22 had lipid,vit d done at work  1/4/23 A1c 4.9%  1/4/23 hep c ab negative       Patient Active Problem List   Diagnosis    Anxiety    Preventative health care    Insomnia    Dyslipidemia    History of basal cell carcinoma    Decreased GFR    Anisocoria    Constipation    Family history of hemochromatosis    Perimenopausal    Knee pain       Depression Screening  Little interest or pleasure in doing things?  0 - not at all  Feeling down, depressed , or hopeless? 0 - not at all  Patient Health Questionnaire Score: 0                  Patient Care Team:  Azael Nunes M.D. as PCP - General      ROS           Objective     /62 (BP Location: Left arm, Patient Position: Sitting, BP Cuff Size: Adult)   Pulse 75   Temp 36.6 °C (97.9 °F) (Temporal)   Ht 1.651 m (5' 5\")   Wt 62.6 kg (138 lb)   " SpO2 99%   BMI 22.96 kg/m²      Physical Exam  Vitals and nursing note reviewed.   Constitutional:       Appearance: Normal appearance.   HENT:      Head: Normocephalic and atraumatic.      Right Ear: External ear normal.      Left Ear: External ear normal.      Nose: Nose normal.   Eyes:      Conjunctiva/sclera: Conjunctivae normal.   Cardiovascular:      Rate and Rhythm: Normal rate and regular rhythm.      Heart sounds: Normal heart sounds.   Pulmonary:      Effort: Pulmonary effort is normal.      Breath sounds: Normal breath sounds.   Abdominal:      General: There is no distension.   Musculoskeletal:         General: No swelling.   Skin:     Coloration: Skin is not jaundiced.      Findings: No bruising.   Neurological:      General: No focal deficit present.      Mental Status: She is alert.   Psychiatric:         Mood and Affect: Mood normal.         Behavior: Behavior normal.          Left knee mild crepitus, no edema lower extremities                   Assessment & Plan     Assessment  #1 annual exam     #2 perimenopausal, on Prempro per gynecology  had a Pap smear July 24    #3 insomnia on Xanax 0.5 mg taking half a tablet at night with no daytime drowsiness or hangover effect, no change in mood, no alcohol with the medication    #4 chronic benzodiazepine use with Xanax 0.5 mg working on slow taper, medication is effective for insomnia without side effects    #5 anxiety working on taper off Zoloft, currently taking 75 mg every other day with no change in mood    #6 skin lesions followed by dermatology annually uses sunscreen outdoors sees     #7 history of left knee pain followed by [facility] orthopedics    #8 preventative health up-to-date on colonoscopy, mammogram, Pap smear, Tdap    Plan  #1 Old records  gynecology, also try to get studies from [facility] imaging and the ER as she had a workup done for right lower quadrant pain which sounds like a pelvic ultrasound and CT  abdomen pelvis    #2 old records dermatology, continue sunscreen use    #3 old records [facility] orthopedics she can use occasional over-the-counter NSAIDs for any knee pain    #4 Labs done this summer at [facility] in July and Pap smear results from gynecology    #5 continue to work on a good nutrition and exercise program, she can try adding extra pea or plant-based protein to her shakes aiming for 100 g total per day of protein    #6 refill Xanax 0.5 mg to her pharmacy, medication is effective without daytime drowsiness or hangover effect, I believe chronic use of Xanax is beneficial understanding long-term effects of habituation, dependence, memory loss, benefits of the medication outweighs potential risks    #7 controlled substance contract signed    #8 continue to work on Zoloft taper, change the dose to 50 mg to the pharmacy she can use 1-1/2 every other day and do a slow taper, should any anxiety symptoms occur on the taper she can stay at that dose or go back to a higher dose    #9 declines flu vaccine    #10  reviewed and consistent    #11 Labs    #12 follow-up 6 months for controlled substance medication use with Xanax

## 2024-12-23 NOTE — LETTER
[facility]  Azael Nunes M.D.  [address]  [place] [address]  Fax: [phone]   Authorization for Release/Disclosure of   Protected Health Information   Name: BING COLLISN : 1975 SSN: [id number]   Address: [address] [place] [address]  [place] [address] Phone:    [phone] (home)    I authorize the entity listed below to release/disclose the PHI below to:   [facility]/Azael Nunes M.D. and Azael Nunes M.D.   Provider or Entity Name:  [facility]   Address   City, [facility], [facility]   Phone:      Fax:  [phone]   Reason for request: continuity of care   Information to be released:    [  ] LAST COLONOSCOPY,  including any PATH REPORT and follow-up  [  ] LAST FIT/COLOGUARD RESULT [  ] LAST DEXA  [ x ] LAST MAMMOGRAM  [  ] LAST PAP  [  ] LAST LABS [  ] RETINA EXAM REPORT  [  ] IMMUNIZATION RECORDS  [ x ] Release ER records  [ X} Release pelvis ultrasound      [  ] Check here and initial the line next to each item to release ALL health information INCLUDING  _____ Care and treatment for drug and / or alcohol abuse  _____ HIV testing, infection status, or AIDS  _____ Genetic Testing    DATES OF SERVICE OR TIME PERIOD TO BE DISCLOSED: _____________  I understand and acknowledge that:  * This Authorization may be revoked at any time by you in writing, except if your health information has already been used or disclosed.  * Your health information that will be used or disclosed as a result of you signing this authorization could be re-disclosed by the recipient. If this occurs, your re-disclosed health information may no longer be protected by State or Federal laws.  * You may refuse to sign this Authorization. Your refusal will not affect your ability to obtain treatment.  * This Authorization becomes effective upon signing and will  on (date) __________.      If no date is indicated, this Authorization will  one (1) year from the  signature date.    Name: Jing Ritter  Signature: continuity of care Date:   12/23/2024     PLEASE FAX REQUESTED RECORDS BACK TO: [phone]

## 2024-12-23 NOTE — LETTER
[employer]  Azael Nunes M.D.  [address]  [place] [address]  Fax: [phone]   Authorization for Release/Disclosure of   Protected Health Information   Name: JING COLLINS : 1975 SSN: [id number]   Address: [address] [place] [address]  [place] [address] Phone:    [phone] (home)    I authorize the entity listed below to release/disclose the PHI below to:   [employer]/Azael uNnes M.D. and Azael Nunes M.D.   Provider or Entity Name:  Dr. Soni   Address   [facility], [facility], [facility]   Phone:      Fax:  [phone]   Reason for request: continuity of care   Information to be released:    [  ] LAST COLONOSCOPY,  including any PATH REPORT and follow-up  [  ] LAST FIT/COLOGUARD RESULT [  ] LAST DEXA  [  ] LAST MAMMOGRAM  [  ] LAST PAP  [  ] LAST LABS [  ] RETINA EXAM REPORT  [  ] IMMUNIZATION RECORDS  [ x ] Release last 12 months      [  ] Check here and initial the line next to each item to release ALL health information INCLUDING  _____ Care and treatment for drug and / or alcohol abuse  _____ HIV testing, infection status, or AIDS  _____ Genetic Testing    DATES OF SERVICE OR TIME PERIOD TO BE DISCLOSED: _____________  I understand and acknowledge that:  * This Authorization may be revoked at any time by you in writing, except if your health information has already been used or disclosed.  * Your health information that will be used or disclosed as a result of you signing this authorization could be re-disclosed by the recipient. If this occurs, your re-disclosed health information may no longer be protected by State or Federal laws.  * You may refuse to sign this Authorization. Your refusal will not affect your ability to obtain treatment.  * This Authorization becomes effective upon signing and will  on (date) __________.      If no date is indicated, this Authorization will  one (1) year from the signature date.    Name: Jing HolcombKady  Riya  Signature:continuity of care Date:   12/23/2024     PLEASE FAX REQUESTED RECORDS BACK TO: [phone]

## 2024-12-23 NOTE — LETTER
[facility]  Azael Nunes M.D.  [address]  [place] [address]  Fax: [phone]   Authorization for Release/Disclosure of   Protected Health Information   Name: JING RITTER : 1975 SSN: [id number]   Address: [address] [place] [address]  [place] [address] Phone:    [phone] (home)    I authorize the entity listed below to release/disclose the PHI below to:   [facility]/Azael Nunes M.D. and Azael Nunes M.D.   Provider or Entity Name:     Address   City, State, Zip   Phone:      Fax:  [phone]   Reason for request: continuity of care   Information to be released:    [  ] LAST COLONOSCOPY,  including any PATH REPORT and follow-up  [  ] LAST FIT/COLOGUARD RESULT [  ] LAST DEXA  [  ] LAST MAMMOGRAM  [  ] LAST PAP  [  ] LAST LABS [  ] RETINA EXAM REPORT  [  ] IMMUNIZATION RECORDS  [ x ] Release last 12 months      [  ] Check here and initial the line next to each item to release ALL health information INCLUDING  _____ Care and treatment for drug and / or alcohol abuse  _____ HIV testing, infection status, or AIDS  _____ Genetic Testing    DATES OF SERVICE OR TIME PERIOD TO BE DISCLOSED: _____________  I understand and acknowledge that:  * This Authorization may be revoked at any time by you in writing, except if your health information has already been used or disclosed.  * Your health information that will be used or disclosed as a result of you signing this authorization could be re-disclosed by the recipient. If this occurs, your re-disclosed health information may no longer be protected by State or Federal laws.  * You may refuse to sign this Authorization. Your refusal will not affect your ability to obtain treatment.  * This Authorization becomes effective upon signing and will  on (date) __________.      If no date is indicated, this Authorization will  one (1) year from the signature date.    Name: Jing Ritter  Signature:continuity of care  Date:   12/23/2024     PLEASE FAX REQUESTED RECORDS BACK TO: [phone]

## 2024-12-24 PROBLEM — Z78.0 POST-MENOPAUSAL: Status: ACTIVE | Noted: 2023-10-09

## 2025-02-20 ENCOUNTER — HOSPITAL ENCOUNTER (OUTPATIENT)
Dept: LAB | Facility: MEDICAL CENTER | Age: 50
End: 2025-02-20
Attending: INTERNAL MEDICINE
Payer: COMMERCIAL

## 2025-02-20 DIAGNOSIS — G47.00 INSOMNIA, UNSPECIFIED TYPE: Chronic | ICD-10-CM

## 2025-02-20 DIAGNOSIS — Z00.00 PREVENTATIVE HEALTH CARE: Chronic | ICD-10-CM

## 2025-02-20 LAB
25(OH)D3 SERPL-MCNC: 52 NG/ML (ref 30–100)
ALBUMIN SERPL BCP-MCNC: 4.3 G/DL (ref 3.2–4.9)
ALBUMIN/GLOB SERPL: 1.5 G/DL
ALP SERPL-CCNC: 39 U/L (ref 30–99)
ALT SERPL-CCNC: 12 U/L (ref 2–50)
ANION GAP SERPL CALC-SCNC: 13 MMOL/L (ref 7–16)
APPEARANCE UR: CLEAR
AST SERPL-CCNC: 25 U/L (ref 12–45)
BASOPHILS # BLD AUTO: 0.6 % (ref 0–1.8)
BASOPHILS # BLD: 0.04 K/UL (ref 0–0.12)
BILIRUB SERPL-MCNC: 0.3 MG/DL (ref 0.1–1.5)
BILIRUB UR QL STRIP.AUTO: NEGATIVE
BUN SERPL-MCNC: 14 MG/DL (ref 8–22)
CALCIUM ALBUM COR SERPL-MCNC: 9.1 MG/DL (ref 8.5–10.5)
CALCIUM SERPL-MCNC: 9.3 MG/DL (ref 8.5–10.5)
CHLORIDE SERPL-SCNC: 101 MMOL/L (ref 96–112)
CHOLEST SERPL-MCNC: 217 MG/DL (ref 100–199)
CO2 SERPL-SCNC: 25 MMOL/L (ref 20–33)
COLOR UR: YELLOW
CREAT SERPL-MCNC: 0.96 MG/DL (ref 0.5–1.4)
EOSINOPHIL # BLD AUTO: 0.18 K/UL (ref 0–0.51)
EOSINOPHIL NFR BLD: 2.8 % (ref 0–6.9)
ERYTHROCYTE [DISTWIDTH] IN BLOOD BY AUTOMATED COUNT: 41.1 FL (ref 35.9–50)
EST. AVERAGE GLUCOSE BLD GHB EST-MCNC: 100 MG/DL
GFR SERPLBLD CREATININE-BSD FMLA CKD-EPI: 72 ML/MIN/1.73 M 2
GLOBULIN SER CALC-MCNC: 2.9 G/DL (ref 1.9–3.5)
GLUCOSE SERPL-MCNC: 82 MG/DL (ref 65–99)
GLUCOSE UR STRIP.AUTO-MCNC: NEGATIVE MG/DL
HBA1C MFR BLD: 5.1 % (ref 4–5.6)
HCT VFR BLD AUTO: 43.1 % (ref 37–47)
HDLC SERPL-MCNC: 93 MG/DL
HGB BLD-MCNC: 13.8 G/DL (ref 12–16)
IMM GRANULOCYTES # BLD AUTO: 0.01 K/UL (ref 0–0.11)
IMM GRANULOCYTES NFR BLD AUTO: 0.2 % (ref 0–0.9)
KETONES UR STRIP.AUTO-MCNC: NEGATIVE MG/DL
LDLC SERPL CALC-MCNC: 99 MG/DL
LEUKOCYTE ESTERASE UR QL STRIP.AUTO: NEGATIVE
LYMPHOCYTES # BLD AUTO: 2.35 K/UL (ref 1–4.8)
LYMPHOCYTES NFR BLD: 36 % (ref 22–41)
MCH RBC QN AUTO: 30.2 PG (ref 27–33)
MCHC RBC AUTO-ENTMCNC: 32 G/DL (ref 32.2–35.5)
MCV RBC AUTO: 94.3 FL (ref 81.4–97.8)
MICRO URNS: NORMAL
MONOCYTES # BLD AUTO: 0.51 K/UL (ref 0–0.85)
MONOCYTES NFR BLD AUTO: 7.8 % (ref 0–13.4)
NEUTROPHILS # BLD AUTO: 3.43 K/UL (ref 1.82–7.42)
NEUTROPHILS NFR BLD: 52.6 % (ref 44–72)
NITRITE UR QL STRIP.AUTO: NEGATIVE
NRBC # BLD AUTO: 0 K/UL
NRBC BLD-RTO: 0 /100 WBC (ref 0–0.2)
PH UR STRIP.AUTO: 6.5 [PH] (ref 5–8)
PLATELET # BLD AUTO: 240 K/UL (ref 164–446)
PMV BLD AUTO: 10.5 FL (ref 9–12.9)
POTASSIUM SERPL-SCNC: 4.1 MMOL/L (ref 3.6–5.5)
PROT SERPL-MCNC: 7.2 G/DL (ref 6–8.2)
PROT UR QL STRIP: NEGATIVE MG/DL
RBC # BLD AUTO: 4.57 M/UL (ref 4.2–5.4)
RBC UR QL AUTO: NEGATIVE
SODIUM SERPL-SCNC: 139 MMOL/L (ref 135–145)
SP GR UR STRIP.AUTO: 1.01
TRIGL SERPL-MCNC: 126 MG/DL (ref 0–149)
TSH SERPL-ACNC: 2.29 UIU/ML (ref 0.35–5.5)
UROBILINOGEN UR STRIP.AUTO-MCNC: 0.2 EU/DL
WBC # BLD AUTO: 6.5 K/UL (ref 4.8–10.8)

## 2025-02-20 PROCEDURE — 80053 COMPREHEN METABOLIC PANEL: CPT

## 2025-02-20 PROCEDURE — 83695 ASSAY OF LIPOPROTEIN(A): CPT

## 2025-02-20 PROCEDURE — 82306 VITAMIN D 25 HYDROXY: CPT

## 2025-02-20 PROCEDURE — 84443 ASSAY THYROID STIM HORMONE: CPT

## 2025-02-20 PROCEDURE — 81003 URINALYSIS AUTO W/O SCOPE: CPT

## 2025-02-20 PROCEDURE — 85025 COMPLETE CBC W/AUTO DIFF WBC: CPT

## 2025-02-20 PROCEDURE — 36415 COLL VENOUS BLD VENIPUNCTURE: CPT

## 2025-02-20 PROCEDURE — 83036 HEMOGLOBIN GLYCOSYLATED A1C: CPT

## 2025-02-20 PROCEDURE — 82172 ASSAY OF APOLIPOPROTEIN: CPT

## 2025-02-20 PROCEDURE — 80061 LIPID PANEL: CPT

## 2025-02-21 ENCOUNTER — RESULTS FOLLOW-UP (OUTPATIENT)
Dept: MEDICAL GROUP | Facility: MEDICAL CENTER | Age: 50
End: 2025-02-21
Payer: COMMERCIAL

## 2025-02-21 DIAGNOSIS — G47.00 INSOMNIA, UNSPECIFIED TYPE: Chronic | ICD-10-CM

## 2025-02-21 RX ORDER — ALPRAZOLAM 0.5 MG
0.5 TABLET ORAL
Qty: 30 TABLET | Refills: 0 | Status: SHIPPED | OUTPATIENT
Start: 2025-02-21 | End: 2025-03-23

## 2025-02-21 RX ORDER — ALPRAZOLAM 0.5 MG
TABLET ORAL
Qty: 30 TABLET | OUTPATIENT
Start: 2025-02-21

## 2025-02-21 NOTE — TELEPHONE ENCOUNTER
Provide patient with labs, cholesterol improved, HDL is outstanding LDL at goal less than 100, A1c is normal indicating no diabetes and no prediabetes, normal hepatic and renal function, vitamin D at goal.  Continue working on a good nutrition and exercise program, repeat labs 1 year.

## 2025-02-21 NOTE — TELEPHONE ENCOUNTER
Received request via: Pharmacy    Was the patient seen in the last year in this department? Yes    Does the patient have an active prescription (recently filled or refills available) for medication(s) requested? No    [facility]     Does the patient have half-way Plus and need 100-day supply? (This applies to ALL medications) Patient does not have SCP

## 2025-02-22 LAB
APO B100 SERPL-MCNC: 85 MG/DL (ref 60–117)
LPA SERPL-MCNC: 9 MG/DL

## 2025-03-11 ENCOUNTER — APPOINTMENT (OUTPATIENT)
Dept: MEDICAL GROUP | Facility: MEDICAL CENTER | Age: 50
End: 2025-03-11
Payer: COMMERCIAL

## 2025-03-18 ENCOUNTER — APPOINTMENT (OUTPATIENT)
Dept: RADIOLOGY | Facility: MEDICAL CENTER | Age: 50
End: 2025-03-18
Attending: INTERNAL MEDICINE
Payer: COMMERCIAL

## 2025-03-18 DIAGNOSIS — Z12.31 VISIT FOR SCREENING MAMMOGRAM: ICD-10-CM

## 2025-03-18 PROCEDURE — 77067 SCR MAMMO BI INCL CAD: CPT

## 2025-03-20 ENCOUNTER — OFFICE VISIT (OUTPATIENT)
Dept: MEDICAL GROUP | Facility: MEDICAL CENTER | Age: 50
End: 2025-03-20
Payer: COMMERCIAL

## 2025-03-20 DIAGNOSIS — G47.00 INSOMNIA, UNSPECIFIED TYPE: Chronic | ICD-10-CM

## 2025-03-20 DIAGNOSIS — F41.9 ANXIETY: ICD-10-CM

## 2025-03-20 PROCEDURE — 3074F SYST BP LT 130 MM HG: CPT | Performed by: INTERNAL MEDICINE

## 2025-03-20 PROCEDURE — 3078F DIAST BP <80 MM HG: CPT | Performed by: INTERNAL MEDICINE

## 2025-03-20 PROCEDURE — 99214 OFFICE O/P EST MOD 30 MIN: CPT | Performed by: INTERNAL MEDICINE

## 2025-03-20 RX ORDER — ALPRAZOLAM 0.5 MG
0.5 TABLET ORAL
Qty: 30 TABLET | Refills: 0 | Status: SHIPPED | OUTPATIENT
Start: 2025-03-20 | End: 2025-04-19

## 2025-03-20 ASSESSMENT — PATIENT HEALTH QUESTIONNAIRE - PHQ9: CLINICAL INTERPRETATION OF PHQ2 SCORE: 0

## 2025-03-20 ASSESSMENT — FIBROSIS 4 INDEX: FIB4 SCORE: 1.47

## 2025-03-20 NOTE — PROGRESS NOTES
Subjective     Jing Ritter is a 49 y.o. female who presents with URI (Cold symptoms)            HPI    New complaint, starting about a month ago developed a sore throat, chest congestion with no significant associated cough, did have nasal drainage and sinus congestion, frontal sinus head pressure, no tooth pain, had ear pressure as well without ear pain, felt more weak and has had shaking episodes when walking noticing shaking in arms and legs, lethargic as well, more generalized joint aches, not able to exercise as she feels more tired. Appetite is unchanged, no nausea, no diarrhea, no blood in stools, no changes in urine.  No rashes, no bruising.  Did a home COVID and flu swab last week negative, also saw  home evaluation and had additional testing for strep and flu that was negative.  She thought she may have been having some withdrawal symptoms as she had decreased the sertraline down to 50 mg so she increased the sertraline dose back to 100 mg with no change in her symptoms.  Sleep unchanged still takes xanax for sleep with no hangover, using nightly as she has been having more fatigue, no daytime drowsiness or hangover effect with the medication.  No tobacco, no asthma or COPD  No history recurrent sinus infections, bronchitis, pneumonia  Medications, allergies, medical history, surgical history, social history, family history  reviewed and updated        Current Outpatient Medications   Medication Sig Dispense Refill    ALPRAZolam (XANAX) 0.5 MG Tab Take 1 Tablet by mouth 1 time a day as needed for Sleep for up to 30 days. 30 Tablet 0    sertraline (ZOLOFT) 50 MG Tab Take 1.5 Tablets by mouth every day. 135 Tablet 1    PREMPRO 0.625-2.5 MG Tab       famciclovir (FAMVIR) 250 MG Tab Take 2 tab tid x 5 days as needed for flare up 60 Tablet 11     No current facility-administered medications for this visit.            anxiety  11/08 zoloft since    6/11 on zoloft 100 mg   2/14/13 tapering off  zoloft down to 50 mg; then off if possible since trying to become pregnant  8/1/14 on zoloft 50 mg  10/5/15 on zoloft 100 mg per gynecology, tried to taper off this summer but not successful  2/13/17 on zoloft 100 mg per gynecology, will try to taper  9/29/17 on zoloft 100 mg having increased from 50 mg 1.5 weeks ago, now just more anxiety and irritability, add xanax 0.25 mg prn   8/26/19 on zoloft 50 mg   9/22/20 restarted zoloft 50 mg five days ago  10/12/20 on zoloft 100 mg for the past 2 weeks, will continue, offered behavioral health  12/21/21 on zoloft 100 mg alternating with 50 mg, work on taper  3/18/22 refill xanax, uses restoril less frequently  8/8/22 xanax 0.5 mg refill  9/14/22 xanax 0.5 mg refill  10/12/22 xanax 0.5 mg refill  11/16/22 on zoloft 50 mg taper, xanax 0.5 mg refill  10/9/23 on zoloft 100 mg  11/16/23 xanax refill  12/21/23 xanax refill  2/5/24 xanax refill  3/17/24 xanax  4/23/24 xanax  5/31/24 xanax  7/6/24 xanax  8/20/24 xanax  9/22/24 xanax  10/9/23 on zoloft 100 mg  12/23/24 on zoloft 75 mg qod, slow taper change to 50 mg       constipation  4/13/22 [facility] note likely slow transit constipation, labs ordered magnesium, phosphorous, thyroid panel, lactulose hydrogen methane breath test, will schedule for screening colonoscopy  8/24/22 colon per [facility] melanosis, consistent with chronic constipation, medium grade 2 internal hemorrhoids, repeat 10 years     Decreased GFR  8/17/11 bun 15,creat 0.5,GFR 59  10/27/15 bun 15,creat 1.1,GFR>60  11/12/15 bun 17,creat 0.9,GFR 54  11/5/16 bun 19,creat 1.0,GFR>60  9/25/17 bun 17,creat 1.1,GFR 55  11/12/18 bun 15,creat 0.97,GFR>60  10/6/20 bun 13,creat 1.06,GFR,56  1/26/22 bun 13,creat 1.0,GFR 56  1/4/23 bun 16,creat 0.96,GFR 73,urine mac<1.2  2/20/25 bun 14,creat 0.96,GFR 72     Dyslipidemia  8/11 chol 199,trig 162,hdl 60,ldl 107  9/25/17 chol 197,trig 135,hdl 91,ldl 79  11/12/18 chol 229,trig 75,hdl 101,ldl 113  10/6/20 chol 200,trig 182,hdl 91,ldl  73  1/26/22 chol 247,trig 120,hdl 101,ldl 122  2/20/25 chol 217,trig 126,hdl 93,ldl 99,lipoprotein a 9, apolipoprotein b 85     history of basal cell carcinoma  1/5/17  dermatology note biopsy basal cell forehead  7/15/20  skin cancer dermatology note   7/27/22  skin cancer dermatology note   7/27/23  skin cancer dermatology note   8/17/23  skin cancer dermatology note   7/25/24  skin cancer dermatology note     Insomnia  9/22/24 xanax  10/30/24 xanax  12/23/24 xanax  12/23/24 contract  2/21/25 xanax    knee pain  1/4/24 dr.fyda LYONS note left knee pain patellofemoral syndrome, will order MRI left knee without contrast  2/1/24 MRI left knee at [facility] mild chondromalacia medial femoral condyle and tibial plateau, severe patellar tilt and subluxation, full-thickness chondromalacia lateral femoral trochlear with underlying marrow edema, small Carrillo's cyst  2/23/24 dr.dolan LYONS left knee patellofemoral arthritis, full cartilage loss, insurance does not cover hyaluronic acid, discussed symptom management with possible arthroscopy and lateral release, patient declines procedure at this time follow-up as needed     post menopausal  5/8/23  gyn note start prempro 0.625-2.5 mg  10/9/23 on prempro 0.45-1.5 mg daily per  gynecology, had been on prepro 0.625-2.5 mg about 5 months ago, [facility] currently on testosterone injections/B12/lysozyme weekly  7/24/24  gynecology note, postmenopausal x 2 years on prempro  8/1/24 ultrasound gynecologic by  gyn ordered for right-sided pelvic pain, normal-appearing uterus, right ovary normal, left ovary normal, no adnexal masses  8/1/24 CT abdomen pelvis with contrast done at [facility] negative  12/23/24 on prempro per  gynecology, not taking testosterone injections       Preventative  10/29/19 tdap  8/24/22 colon per [facility] melanosis, consistent with chronic  constipation, medium grade 2 internal hemorrhoids, repeat 10 years  1/4/23 hep c ab negative  7/24/24 pap per  gyn  2/20/25 A1c 5.1%  2/20/25 vit d 52  3/18/25 mammogram                    anxiety  11/08 zoloft since    6/11 on zoloft 100 mg   2/14/13 tapering off zoloft down to 50 mg; then off if possible since trying to become pregnant  8/1/14 on zoloft 50 mg  10/5/15 on zoloft 100 mg per gynecology, tried to taper off this summer but not successful  2/13/17 on zoloft 100 mg per gynecology, will try to taper  9/29/17 on zoloft 100 mg having increased from 50 mg 1.5 weeks ago, now just more anxiety and irritability, add xanax 0.25 mg prn   8/26/19 on zoloft 50 mg   9/22/20 restarted zoloft 50 mg five days ago  10/12/20 on zoloft 100 mg for the past 2 weeks, will continue, offered behavioral health  12/21/21 on zoloft 100 mg alternating with 50 mg, work on taper  3/18/22 refill xanax, uses restoril less frequently  8/8/22 xanax 0.5 mg refill  9/14/22 xanax 0.5 mg refill  10/12/22 xanax 0.5 mg refill  11/16/22 on zoloft 50 mg taper, xanax 0.5 mg refill  10/9/23 on zoloft 100 mg  11/16/23 xanax refill  12/21/23 xanax refill  2/5/24 xanax refill  3/17/24 xanax  4/23/24 xanax  5/31/24 xanax  7/6/24 xanax  8/20/24 xanax  9/22/24 xanax  12/23/24 on zolof 75 mg qod, slow taper change to 50 mg      constipation  4/13/22 [facility] note likely slow transit constipation, labs ordered magnesium, phosphorous, thyroid panel, lactulose hydrogen methane breath test, will schedule for screening colonoscopy  8/24/22 colon per [facility] melanosis, consistent with chronic constipation, medium grade 2 internal hemorrhoids, repeat 10 years     Decreased GFR  8/17/11 bun 15,creat 0.5,GFR 59  10/27/15 bun 15,creat 1.1,GFR>60  11/12/15 bun 17,creat 0.9,GFR 54  11/5/16 bun 19,creat 1.0,GFR>60  9/25/17 bun 17,creat 1.1,GFR 55  11/12/18 bun 15,creat 0.97,GFR>60  10/6/20 bun 13,creat 1.06,GFR,56  1/26/22 bun 13,creat 1.0,GFR 56  1/4/23 bun  16,creat 0.96,GFR 73,urine mac<1.2  2/20/25 bun 14,creat 0.96,GFR 72     Dyslipidemia  8/11 chol 199,trig 162,hdl 60,ldl 107  9/25/17 chol 197,trig 135,hdl 91,ldl 79  11/12/18 chol 229,trig 75,hdl 101,ldl 113  10/6/20 chol 200,trig 182,hdl 91,ldl 73  1/26/22 chol 247,trig 120,hdl 101,ldl 122  2/20/25 chol 217,trig 126,hdl 93,ldl 99,lipoprotein a 9, apolipoprotein b 85     history of basal cell carcinoma  1/5/17  dermatology note biopsy basal cell forehead  7/15/20  skin cancer dermatology note   7/27/22  skin cancer dermatology note   7/27/23  skin cancer dermatology note   8/17/23  skin cancer dermatology note   7/25/24  skin cancer dermatology note     Insomnia  12/23/24 xanax  12/23/24 contract  2/21/25 xanax    knee pain  1/4/24 dr.fyda LYONS note left knee pain patellofemoral syndrome, will order MRI left knee without contrast  2/1/24 MRI left knee at [facility] mild chondromalacia medial femoral condyle and tibial plateau, severe patellar tilt and subluxation, full-thickness chondromalacia lateral femoral trochlear with underlying marrow edema, small Carrillo's cyst  2/23/24 dr.dolan LYONS left knee patellofemoral arthritis, full cartilage loss, insurance does not cover hyaluronic acid, discussed symptom management with possible arthroscopy and lateral release, patient declines procedure at this time follow-up as needed     post menopausal  5/8/23  gyn note start prempro 0.625-2.5 mg  10/9/23 on prempro 0.45-1.5 mg daily per  gynecology, had been on prepro 0.625-2.5 mg about 5 months ago, also [facility] currently on testosterone injections/B12/lysozyme weekly  7/24/24  gynecology note, postmenopausal x 2 years on prempro  8/1/24 ultrasound gynecologic by  gyn ordered for right-sided pelvic pain, normal-appearing uterus, right ovary normal, left ovary normal, no adnexal masses  8/1/24 CT abdomen pelvis with  "contrast done at [facility] negative  12/23/24 on prempro per  gynecology, not taking testosterone injections       Preventative  10/29/19 tdap  4/28/22 mammogram  8/24/22 colon per [facility] melanosis, consistent with chronic constipation, medium grade 2 internal hemorrhoids, repeat 10 years  1/4/23 hep c ab negative  7/24/24 pap per  gyn  2/20/25 A1c 5.1%  2/20/25 vit d 52      Patient Active Problem List   Diagnosis    Anxiety    Preventative health care    Insomnia    Dyslipidemia    History of basal cell carcinoma    Decreased GFR    Anisocoria    Constipation    Family history of hemochromatosis    Post-menopausal    Knee pain       Depression Screening  Little interest or pleasure in doing things?  0 - not at all  Feeling down, depressed , or hopeless? 0 - not at all  Patient Health Questionnaire Score: 0            Patient Care Team:  Azael Nunes M.D. as PCP - General        ROS           Objective     BP (!) 80/52 (BP Location: Right arm, Patient Position: Sitting, BP Cuff Size: Adult)   Pulse 69   Temp 37 °C (98.6 °F) (Temporal)   Resp 14   Ht 1.651 m (5' 5\")   Wt 63.3 kg (139 lb 9.6 oz)   SpO2 100%   BMI 23.23 kg/m²      Physical Exam  Vitals and nursing note reviewed.   Constitutional:       General: She is not in acute distress.     Appearance: Normal appearance.   HENT:      Head: Normocephalic and atraumatic.      Right Ear: Tympanic membrane and external ear normal.      Left Ear: Tympanic membrane and external ear normal.      Nose: Nose normal.      Comments: Frontal sinus tenderness to palpation, no maxillary tenderness     Mouth/Throat:      Mouth: Mucous membranes are moist.      Pharynx: No oropharyngeal exudate.   Eyes:      Conjunctiva/sclera: Conjunctivae normal.   Cardiovascular:      Rate and Rhythm: Normal rate and regular rhythm.      Heart sounds: Normal heart sounds.   Pulmonary:      Effort: No respiratory distress.      Breath sounds: Normal breath sounds. No " wheezing or rales.   Abdominal:      General: There is no distension.   Musculoskeletal:         General: No swelling.      Cervical back: Neck supple. No rigidity.   Lymphadenopathy:      Cervical: No cervical adenopathy.   Skin:     Coloration: Skin is not jaundiced.   Neurological:      General: No focal deficit present.      Mental Status: She is alert.   Psychiatric:         Mood and Affect: Mood normal.         Behavior: Behavior normal.          No petechia, purpura, or rash    Normal  strength bilateral  Normal dorsiflexion, plantarflexion bilateral, normal knee flexion extension strength bilateral  No tremor         Assessment & Plan    Assessment  #1 upper respiratory tract symptoms nasal congestion, some frontal sinus pressure, ear pressure x 4 weeks now, she did a home COVID and flu swab negative last week, also saw  at home as well and had a negative strep test and flu, on exam today afebrile, no evidence of pharyngitis, no evidence of otitis, lungs are clear, saturation normal, some sinus pressure suspect sinusitis given duration of symptoms with negative testing thus far, also consider viral etiology, lower blood pressure but afebrile, normal saturation, not tachycardic, not tachypneic, no sepsis     #2 shakiness of arms and legs, no weakness on exam question part of the viral infection or sinusitis causing more generalized symptoms, no focal deficits on exam, no history of hypothyroid, she just had labs 4 weeks ago normal CBC, CMP, TSH, urinalysis, A1c    #3 anxiety has increased back to 100 mg dose after tried to taper recently, she thought she might be having withdrawals from going to the low-dose, but back on her standard 100 mg dose no change in symptoms    #4 chronic insomnia on Xanax no daytime drowsiness or hangover effect    Plan  #1 Augmentin 875 mg twice daily, can cause nausea, loose stools, rash, ensure adequate water intake, notify us if side effects    #2 over-the-counter  Flonase    #3 over-the-counter Claritin    #4 over-the-counter [facility] pot    #5 refilled Xanax,  consistent times well-controlled with the medication, medication may cause memory loss, mood changes, I believe medications benefits of the Xanax for sleep outweighs potential risks of habituation, dependence, memory loss.  Up-to-date on contract    #6 continue Zoloft 100 mg daily for now, can work on tapering and later after symptoms resolve    #7 send us an update on her symptoms or if she has side effects on the Augmentin, for now no chest x-ray no labs are necessary    #8 she will be traveling to [place] next week, can use the Flonase, Claritin to avoid eustachian tube dysfunction with her sinusitis and her travel

## 2025-03-24 VITALS
SYSTOLIC BLOOD PRESSURE: 80 MMHG | DIASTOLIC BLOOD PRESSURE: 52 MMHG | HEIGHT: 65 IN | TEMPERATURE: 98.6 F | HEART RATE: 69 BPM | RESPIRATION RATE: 14 BRPM | BODY MASS INDEX: 23.26 KG/M2 | OXYGEN SATURATION: 100 % | WEIGHT: 139.6 LBS

## 2025-03-26 ENCOUNTER — RESULTS FOLLOW-UP (OUTPATIENT)
Dept: MEDICAL GROUP | Facility: MEDICAL CENTER | Age: 50
End: 2025-03-26

## 2025-05-16 DIAGNOSIS — G47.00 INSOMNIA, UNSPECIFIED TYPE: Chronic | ICD-10-CM

## 2025-05-17 DIAGNOSIS — G47.00 INSOMNIA, UNSPECIFIED TYPE: Chronic | ICD-10-CM

## 2025-05-17 RX ORDER — ALPRAZOLAM 0.5 MG
0.5 TABLET ORAL
Qty: 30 TABLET | Refills: 0 | Status: SHIPPED | OUTPATIENT
Start: 2025-05-17 | End: 2025-06-16

## 2025-05-17 RX ORDER — ALPRAZOLAM 0.5 MG
TABLET ORAL
Qty: 30 TABLET | OUTPATIENT
Start: 2025-05-17

## 2025-06-30 DIAGNOSIS — G47.00 INSOMNIA, UNSPECIFIED TYPE: Chronic | ICD-10-CM

## 2025-06-30 NOTE — TELEPHONE ENCOUNTER
Received request via: Pharmacy    Was the patient seen in the last year in this department? Yes    Does the patient have an active prescription (recently filled or refills available) for medication(s) requested? No    Pharmacy Name: [facility]    Does the patient have [facility] and need 100-day supply? (This applies to ALL medications) Patient does not have [facility]

## 2025-07-01 DIAGNOSIS — G47.00 INSOMNIA, UNSPECIFIED TYPE: Chronic | ICD-10-CM

## 2025-07-01 RX ORDER — ALPRAZOLAM 0.5 MG
TABLET ORAL
Qty: 30 TABLET | OUTPATIENT
Start: 2025-07-01

## 2025-07-01 RX ORDER — ALPRAZOLAM 0.5 MG
0.5 TABLET ORAL
Qty: 30 TABLET | Refills: 0 | Status: SHIPPED | OUTPATIENT
Start: 2025-07-31 | End: 2025-08-30

## 2025-07-01 RX ORDER — ALPRAZOLAM 0.5 MG
0.5 TABLET ORAL
Qty: 30 TABLET | Refills: 0 | Status: SHIPPED | OUTPATIENT
Start: 2025-07-01 | End: 2025-07-31

## 2025-07-21 ENCOUNTER — APPOINTMENT (OUTPATIENT)
Dept: URBAN - METROPOLITAN AREA CLINIC 35 | Facility: CLINIC | Age: 50
Setting detail: DERMATOLOGY
End: 2025-07-21

## 2025-07-21 DIAGNOSIS — Z71.89 OTHER SPECIFIED COUNSELING: ICD-10-CM

## 2025-07-21 DIAGNOSIS — Z85.828 PERSONAL HISTORY OF OTHER MALIGNANT NEOPLASM OF SKIN: ICD-10-CM

## 2025-07-21 DIAGNOSIS — D22 MELANOCYTIC NEVI: ICD-10-CM

## 2025-07-21 DIAGNOSIS — L82.1 OTHER SEBORRHEIC KERATOSIS: ICD-10-CM

## 2025-07-21 DIAGNOSIS — L81.4 OTHER MELANIN HYPERPIGMENTATION: ICD-10-CM

## 2025-07-21 PROBLEM — D22.5 MELANOCYTIC NEVI OF TRUNK: Status: ACTIVE | Noted: 2025-07-21

## 2025-07-21 PROCEDURE — ? COUNSELING

## 2025-07-21 ASSESSMENT — LOCATION SIMPLE DESCRIPTION DERM
LOCATION SIMPLE: RIGHT HAND
LOCATION SIMPLE: RIGHT SCALP
LOCATION SIMPLE: RIGHT UPPER BACK
LOCATION SIMPLE: LEFT TEMPLE
LOCATION SIMPLE: LEFT ZYGOMA

## 2025-07-21 ASSESSMENT — LOCATION ZONE DERM
LOCATION ZONE: HAND
LOCATION ZONE: SCALP
LOCATION ZONE: FACE
LOCATION ZONE: TRUNK

## 2025-07-21 ASSESSMENT — LOCATION DETAILED DESCRIPTION DERM
LOCATION DETAILED: RIGHT CENTRAL FRONTAL SCALP
LOCATION DETAILED: RIGHT INFERIOR UPPER BACK
LOCATION DETAILED: RIGHT RADIAL DORSAL HAND
LOCATION DETAILED: RIGHT SUPERIOR UPPER BACK
LOCATION DETAILED: LEFT CENTRAL ZYGOMA
LOCATION DETAILED: LEFT INFERIOR TEMPLE
LOCATION DETAILED: RIGHT MID-UPPER BACK

## 2025-08-16 RX ORDER — SERTRALINE HYDROCHLORIDE 100 MG/1
100 TABLET, FILM COATED ORAL DAILY
Qty: 90 TABLET | Refills: 1 | Status: SHIPPED | OUTPATIENT
Start: 2025-08-16